# Patient Record
Sex: MALE | Race: WHITE | NOT HISPANIC OR LATINO
[De-identification: names, ages, dates, MRNs, and addresses within clinical notes are randomized per-mention and may not be internally consistent; named-entity substitution may affect disease eponyms.]

---

## 2017-11-28 ENCOUNTER — APPOINTMENT (OUTPATIENT)
Dept: UROLOGY | Facility: CLINIC | Age: 57
End: 2017-11-28

## 2018-08-14 ENCOUNTER — APPOINTMENT (OUTPATIENT)
Dept: UROLOGY | Facility: CLINIC | Age: 58
End: 2018-08-14
Payer: COMMERCIAL

## 2018-08-14 VITALS
HEART RATE: 71 BPM | BODY MASS INDEX: 28 KG/M2 | RESPIRATION RATE: 17 BRPM | WEIGHT: 200 LBS | DIASTOLIC BLOOD PRESSURE: 81 MMHG | HEIGHT: 71 IN | SYSTOLIC BLOOD PRESSURE: 123 MMHG | TEMPERATURE: 98.2 F

## 2018-08-14 DIAGNOSIS — C61 MALIGNANT NEOPLASM OF PROSTATE: ICD-10-CM

## 2018-08-14 PROCEDURE — 99213 OFFICE O/P EST LOW 20 MIN: CPT

## 2020-03-10 ENCOUNTER — APPOINTMENT (OUTPATIENT)
Dept: INTERNAL MEDICINE | Facility: CLINIC | Age: 60
End: 2020-03-10

## 2020-03-19 ENCOUNTER — APPOINTMENT (OUTPATIENT)
Dept: FAMILY MEDICINE | Facility: CLINIC | Age: 60
End: 2020-03-19

## 2022-11-14 ENCOUNTER — LABORATORY RESULT (OUTPATIENT)
Age: 62
End: 2022-11-14

## 2022-11-14 ENCOUNTER — APPOINTMENT (OUTPATIENT)
Dept: UROLOGY | Facility: CLINIC | Age: 62
End: 2022-11-14

## 2022-11-14 VITALS
SYSTOLIC BLOOD PRESSURE: 118 MMHG | BODY MASS INDEX: 28.7 KG/M2 | TEMPERATURE: 97.4 F | HEIGHT: 71 IN | DIASTOLIC BLOOD PRESSURE: 80 MMHG | WEIGHT: 205 LBS

## 2022-11-14 DIAGNOSIS — R35.0 FREQUENCY OF MICTURITION: ICD-10-CM

## 2022-11-14 DIAGNOSIS — N30.91 CYSTITIS, UNSPECIFIED WITH HEMATURIA: ICD-10-CM

## 2022-11-14 DIAGNOSIS — N39.0 URINARY TRACT INFECTION, SITE NOT SPECIFIED: ICD-10-CM

## 2022-11-14 DIAGNOSIS — Z85.46 PERSONAL HISTORY OF MALIGNANT NEOPLASM OF PROSTATE: ICD-10-CM

## 2022-11-14 DIAGNOSIS — N30.40 IRRADIATION CYSTITIS W/OUT HEMATURIA: ICD-10-CM

## 2022-11-14 DIAGNOSIS — E78.00 PURE HYPERCHOLESTEROLEMIA, UNSPECIFIED: ICD-10-CM

## 2022-11-14 DIAGNOSIS — N39.3 STRESS INCONTINENCE (FEMALE) (MALE): ICD-10-CM

## 2022-11-14 DIAGNOSIS — T66.XXXA RADIATION SICKNESS, UNSPECIFIED, INITIAL ENCOUNTER: ICD-10-CM

## 2022-11-14 DIAGNOSIS — C61 MALIGNANT NEOPLASM OF PROSTATE: ICD-10-CM

## 2022-11-14 DIAGNOSIS — Z96.0 PRESENCE OF UROGENITAL IMPLANTS: ICD-10-CM

## 2022-11-14 DIAGNOSIS — R33.9 RETENTION OF URINE, UNSPECIFIED: ICD-10-CM

## 2022-11-14 DIAGNOSIS — Z48.816 ENCOUNTER FOR SURGICAL AFTERCARE FOLLOWING SURGERY ON THE GENITOURINARY SYSTEM: ICD-10-CM

## 2022-11-14 PROCEDURE — 51741 ELECTRO-UROFLOWMETRY FIRST: CPT

## 2022-11-14 PROCEDURE — 99205 OFFICE O/P NEW HI 60 MIN: CPT | Mod: 25

## 2022-11-14 PROCEDURE — 52000 CYSTOURETHROSCOPY: CPT

## 2022-11-14 PROCEDURE — 51798 US URINE CAPACITY MEASURE: CPT | Mod: 59

## 2022-11-14 RX ORDER — EVOLOCUMAB 140 MG/ML
140 INJECTION, SOLUTION SUBCUTANEOUS
Refills: 0 | Status: ACTIVE | COMMUNITY

## 2022-11-14 RX ORDER — FINASTERIDE 1 MG/1
TABLET ORAL
Refills: 0 | Status: ACTIVE | COMMUNITY

## 2022-11-14 NOTE — HISTORY OF PRESENT ILLNESS
[FreeTextEntry1] : 62M with hx of prostate CA 2008 with adjuvant radiation 2009, ED s/p IPP, presenting for urinary incontinence.\par \par The incontinence is associated with strenuous activity/exercise. It is less severe when he is not active.\par However it is becoming more bothersome to patient. Uses ~2 ppd.\par Also notes he has been getting more UTIs. About 4 per year. The UTIs are associated with gross hematuria.\par He has not had a cystoscopy or renal imaging.\par \par PSAs have been undetectable per patient.\par \par IPP still works well. No issue with it.

## 2022-11-14 NOTE — ASSESSMENT
[FreeTextEntry1] : BLADDER SCAN:\par Indication: Increased frequency of urination. \par Initial Volume: 260   cc\par PVR: 13  cc\par Results: Stress urinary incontinence. \par Recommendations: No Therapy Needed.\par \par URO FLOWMETRY:\par Indication: Increased frequency of urination. \par Urinary Flow Rate:  11.2  m/s\par Results: Stress urinary incontinence    \par Recommendations: No therapy needed.\par \par BARBARA COSTA \par Jul 29 1960 \par 11/14/2022 \par \par Procedure: Cystoscopy \par \par Location: [facility] at [address] [place] [address] \par Surgeon: SHARMIN Simon M.D.\par Preop Diagnosis: Prostate cancer, hematuria Rule out tumor Postop Diagnosis\par Postop Diagnosis: Same, No stricture noted, no bladder tumor\par  Anesthesia: 2% Intraurethral Lidocaine Jelly \par Medication: Cipro \par Complications: None \par \par Operative Note: \par \par Discussed with the patient, risks and benefits of cystourethroscopy which includes hematuria, dysuria, stricture formation. The patient agrees to proceed with the above procedure.\par  The patient was placed in the dorsal lithotomy position, prepped and draped in the usual standard sterile fashion with surgical Betadine soap and wash. 2% lidocaine jelly was inserted intraurethrally via the meatus. A clamp was applied to the penis and lidocaine jelly was allowed to remain in place for 5 minutes. The meatus was then intubated with a 16F flexible cystoscope. Visual cystourethroscopic examination was initiated under sterile water irrigation. \par The following findings were noted:\par \par The anterior urethra was normal. \par The Bulbar urethra was also normal. \par The membranous urethra was normal without any strictures. \par Ureteral orifices were identified and clear efflux of urine was observed bilaterally. \par The bladder was examined in its entirety in a systematic fashion. \par Trabeculation observed: Mild\par Spots of erythema in bladder (radiation changes). Small area of cystitis on right lateral wall\par \par The patient tolerated the procedure well.\par He was discharged to home with PO antibiotics in stable condition. \par \par After cystoscopy:\par \par BLADDER SCAN:\par Indication: Increased frequency of urination. \par Initial Volume:  432  cc\par PVR:  25 cc\par Results: Stress urinary incontinence. \par Recommendations: No Therapy Needed.\par \par URO FLOWMETRY:\par Indication: Increased frequency of urination. \par Urinary Flow Rate:  13.7  m/s\par Results: Stress urinary incontinence    \par Recommendations: No therapy needed.\par

## 2022-12-22 LAB — URINE CYTOLOGY: NORMAL

## 2024-08-22 ENCOUNTER — NON-APPOINTMENT (OUTPATIENT)
Age: 64
End: 2024-08-22

## 2024-08-23 ENCOUNTER — TRANSCRIPTION ENCOUNTER (OUTPATIENT)
Age: 64
End: 2024-08-23

## 2024-08-24 ENCOUNTER — INPATIENT (INPATIENT)
Facility: HOSPITAL | Age: 64
LOS: 2 days | Discharge: HOME | DRG: 690 | End: 2024-08-27
Attending: GENERAL PRACTICE | Admitting: GENERAL PRACTICE
Payer: COMMERCIAL

## 2024-08-24 VITALS
DIASTOLIC BLOOD PRESSURE: 83 MMHG | OXYGEN SATURATION: 98 % | WEIGHT: 199.96 LBS | HEIGHT: 71 IN | TEMPERATURE: 100 F | SYSTOLIC BLOOD PRESSURE: 132 MMHG | HEART RATE: 71 BPM | RESPIRATION RATE: 18 BRPM

## 2024-08-24 DIAGNOSIS — Z90.79 ACQUIRED ABSENCE OF OTHER GENITAL ORGAN(S): Chronic | ICD-10-CM

## 2024-08-24 LAB
ALBUMIN SERPL ELPH-MCNC: 3.7 G/DL — SIGNIFICANT CHANGE UP (ref 3.3–5)
ALP SERPL-CCNC: 63 U/L — SIGNIFICANT CHANGE UP (ref 40–120)
ALT FLD-CCNC: 40 U/L — SIGNIFICANT CHANGE UP (ref 10–45)
ANION GAP SERPL CALC-SCNC: 12 MMOL/L — SIGNIFICANT CHANGE UP (ref 5–17)
APPEARANCE UR: CLEAR — SIGNIFICANT CHANGE UP
APTT BLD: 31.4 SEC — SIGNIFICANT CHANGE UP (ref 24.5–35.6)
AST SERPL-CCNC: 34 U/L — SIGNIFICANT CHANGE UP (ref 10–40)
BACTERIA # UR AUTO: NEGATIVE /HPF — SIGNIFICANT CHANGE UP
BASOPHILS # BLD AUTO: 0.02 K/UL — SIGNIFICANT CHANGE UP (ref 0–0.2)
BASOPHILS NFR BLD AUTO: 0.2 % — SIGNIFICANT CHANGE UP (ref 0–2)
BILIRUB SERPL-MCNC: 0.4 MG/DL — SIGNIFICANT CHANGE UP (ref 0.2–1.2)
BILIRUB UR-MCNC: NEGATIVE — SIGNIFICANT CHANGE UP
BUN SERPL-MCNC: 13 MG/DL — SIGNIFICANT CHANGE UP (ref 7–23)
CALCIUM SERPL-MCNC: 9.2 MG/DL — SIGNIFICANT CHANGE UP (ref 8.4–10.5)
CAST: 1 /LPF — SIGNIFICANT CHANGE UP (ref 0–4)
CHLORIDE SERPL-SCNC: 103 MMOL/L — SIGNIFICANT CHANGE UP (ref 96–108)
CO2 SERPL-SCNC: 23 MMOL/L — SIGNIFICANT CHANGE UP (ref 22–31)
COLOR SPEC: YELLOW — SIGNIFICANT CHANGE UP
CREAT SERPL-MCNC: 1.2 MG/DL — SIGNIFICANT CHANGE UP (ref 0.5–1.3)
DIFF PNL FLD: ABNORMAL
EGFR: 68 ML/MIN/1.73M2 — SIGNIFICANT CHANGE UP
EOSINOPHIL # BLD AUTO: 0.01 K/UL — SIGNIFICANT CHANGE UP (ref 0–0.5)
EOSINOPHIL NFR BLD AUTO: 0.1 % — SIGNIFICANT CHANGE UP (ref 0–6)
GAS PNL BLDV: SIGNIFICANT CHANGE UP
GLUCOSE SERPL-MCNC: 111 MG/DL — HIGH (ref 70–99)
GLUCOSE UR QL: NEGATIVE MG/DL — SIGNIFICANT CHANGE UP
HCT VFR BLD CALC: 43.8 % — SIGNIFICANT CHANGE UP (ref 39–50)
HGB BLD-MCNC: 15 G/DL — SIGNIFICANT CHANGE UP (ref 13–17)
IMM GRANULOCYTES NFR BLD AUTO: 0.6 % — SIGNIFICANT CHANGE UP (ref 0–0.9)
INR BLD: 1.1 RATIO — SIGNIFICANT CHANGE UP (ref 0.85–1.18)
KETONES UR-MCNC: ABNORMAL MG/DL
LEUKOCYTE ESTERASE UR-ACNC: ABNORMAL
LYMPHOCYTES # BLD AUTO: 0.49 K/UL — LOW (ref 1–3.3)
LYMPHOCYTES # BLD AUTO: 4.9 % — LOW (ref 13–44)
MCHC RBC-ENTMCNC: 32.1 PG — SIGNIFICANT CHANGE UP (ref 27–34)
MCHC RBC-ENTMCNC: 34.2 GM/DL — SIGNIFICANT CHANGE UP (ref 32–36)
MCV RBC AUTO: 93.6 FL — SIGNIFICANT CHANGE UP (ref 80–100)
MONOCYTES # BLD AUTO: 0.96 K/UL — HIGH (ref 0–0.9)
MONOCYTES NFR BLD AUTO: 9.5 % — SIGNIFICANT CHANGE UP (ref 2–14)
NEUTROPHILS # BLD AUTO: 8.54 K/UL — HIGH (ref 1.8–7.4)
NEUTROPHILS NFR BLD AUTO: 84.7 % — HIGH (ref 43–77)
NITRITE UR-MCNC: NEGATIVE — SIGNIFICANT CHANGE UP
NRBC # BLD: 0 /100 WBCS — SIGNIFICANT CHANGE UP (ref 0–0)
PH UR: 8 — SIGNIFICANT CHANGE UP (ref 5–8)
PLATELET # BLD AUTO: 159 K/UL — SIGNIFICANT CHANGE UP (ref 150–400)
POTASSIUM SERPL-MCNC: 4 MMOL/L — SIGNIFICANT CHANGE UP (ref 3.5–5.3)
POTASSIUM SERPL-SCNC: 4 MMOL/L — SIGNIFICANT CHANGE UP (ref 3.5–5.3)
PROT SERPL-MCNC: 6.8 G/DL — SIGNIFICANT CHANGE UP (ref 6–8.3)
PROT UR-MCNC: 100 MG/DL
PROTHROM AB SERPL-ACNC: 11.5 SEC — SIGNIFICANT CHANGE UP (ref 9.5–13)
RBC # BLD: 4.68 M/UL — SIGNIFICANT CHANGE UP (ref 4.2–5.8)
RBC # FLD: 13.2 % — SIGNIFICANT CHANGE UP (ref 10.3–14.5)
RBC CASTS # UR COMP ASSIST: 6 /HPF — HIGH (ref 0–4)
SODIUM SERPL-SCNC: 138 MMOL/L — SIGNIFICANT CHANGE UP (ref 135–145)
SP GR SPEC: 1.02 — SIGNIFICANT CHANGE UP (ref 1–1.03)
SQUAMOUS # UR AUTO: 1 /HPF — SIGNIFICANT CHANGE UP (ref 0–5)
UROBILINOGEN FLD QL: 2 MG/DL (ref 0.2–1)
WBC # BLD: 10.08 K/UL — SIGNIFICANT CHANGE UP (ref 3.8–10.5)
WBC # FLD AUTO: 10.08 K/UL — SIGNIFICANT CHANGE UP (ref 3.8–10.5)
WBC UR QL: 17 /HPF — HIGH (ref 0–5)

## 2024-08-24 PROCEDURE — 99223 1ST HOSP IP/OBS HIGH 75: CPT

## 2024-08-24 RX ORDER — KETOROLAC TROMETHAMINE 30 MG/ML
15 INJECTION, SOLUTION INTRAMUSCULAR ONCE
Refills: 0 | Status: DISCONTINUED | OUTPATIENT
Start: 2024-08-24 | End: 2024-08-24

## 2024-08-24 RX ORDER — ACETAMINOPHEN 325 MG/1
1000 TABLET ORAL ONCE
Refills: 0 | Status: COMPLETED | OUTPATIENT
Start: 2024-08-24 | End: 2024-08-24

## 2024-08-24 RX ADMIN — KETOROLAC TROMETHAMINE 15 MILLIGRAM(S): 30 INJECTION, SOLUTION INTRAMUSCULAR at 23:35

## 2024-08-24 RX ADMIN — ACETAMINOPHEN 1000 MILLIGRAM(S): 325 TABLET ORAL at 22:15

## 2024-08-24 RX ADMIN — Medication 125 MILLILITER(S): at 22:51

## 2024-08-24 RX ADMIN — Medication 200 MILLIGRAM(S): at 18:14

## 2024-08-24 RX ADMIN — Medication 1000 MILLILITER(S): at 18:14

## 2024-08-24 RX ADMIN — ACETAMINOPHEN 400 MILLIGRAM(S): 325 TABLET ORAL at 21:45

## 2024-08-24 NOTE — ED PROVIDER NOTE - OBJECTIVE STATEMENT
62M with hx of prostate CA 2008 with adjuvant radiation 2009, ED s/p inflatable penile prosthesis, presents for worsening back pain. Per pt, he has multiple UTI, however, no culture in our system. Seen in this ED yesterday for dysuria. Dx with UTI. Given CTX at ED, d/c home with cefpodoxime. Reports episode of rigors and worsening back pain overnight. Return to ED for further work up. +fever, chills, back pain. nausea. No vomiting. no dysuria.

## 2024-08-24 NOTE — ED PROVIDER NOTE - CLINICAL SUMMARY MEDICAL DECISION MAKING FREE TEXT BOX
62M with hx of prostate CA 2008 with adjuvant radiation 2009, ED s/p inflatable penile prosthesis, presents for UTI, questionable failed outpatient therapy. Will repeat work up. Because pt reports worsening sx on Cefpodoxime, will put on cipro. Likely admit to CDU pending culture.

## 2024-08-24 NOTE — ED ADULT NURSE NOTE - OBJECTIVE STATEMENT
64  year old male was seen last night in the ed ans d/c with UTI.  [place] better and sent home with oral antibiotics and still has pain and fever Last took Tylenol at 3 pm IV placed and bloods and urine sent as ordered MpRN

## 2024-08-24 NOTE — ED ADULT NURSE NOTE - HIV OFFER
How Severe Is Your Skin Lesion?: mild
Is This A New Presentation, Or A Follow-Up?: Growths
Additional History: Pt notes “oily deposits” to his forehead.
Previously Declined (within the last year)

## 2024-08-24 NOTE — ED PROVIDER NOTE - PHYSICAL EXAMINATION
GENERAL: Alert. No acute distress.   EYES: EOMI grossly normal. Anicteric.   HENT: Moist mucous membranes.   RESP: No conversation dyspnea, no resp distress, CTAB   CARDIOVASCULAR: RRR, no m/g/r  ABDOMEN: soft, non distended, nttp  MSK: ROM grossly normal in all 4 extremities. No deformities  SKIN: warm and dry  NEUROLOGIC: Alert and oriented x3  PSYCHIATRIC: Cooperative. Appropriate mood and affect

## 2024-08-24 NOTE — ED PROVIDER NOTE - ATTENDING CONTRIBUTION TO CARE
62M with hx of prostate CA 2008 with adjuvant radiation 2009, ED s/p inflatable penile prosthesis, presents 62M with hx of prostate CA 2008 with adjuvant radiation 2009, ED s/p inflatable penile prosthesis, presents With fever and chills.  Patient reports he was seen and evaluated in the emergency department yesterday, diagnosed with a urinary tract infection and sent home with cefpodoxime.  Patient states his dysuria has improved but continues to have fever and chills. Patient follows with Dr. Simon for urology, was prompted to go to the emergency department. Denies chest pain, shortness of breath, Denies pain, nausea, vomiting, diarrhea, dysuria, hematuria, Testicular swelling, testicular pain, penile discharge.    Physical Exam:  Gen: NAD, awake and alert, non-toxic appearing  HEENT: Atraumatic, oropharynx clear, moist mucous membranes, normal conjunctiva  Cardio: RRR, no murmurs, rubs or gallops  Lung: CTAB, no respiratory distress, no wheezes/rhonchi/rales B/L  Abd: soft, NT, ND, no guarding, no rigidity, no rebound tenderness, no CVA tenderness     Patient presents with fever and chills in setting of presumably failed outpatient UTI treatment. Vitals within normal limits, hemodynamically stable, exam as above.  Low suspicion for pyelonephritis, nephrolithiasis (no colicky pain, no CVAT) Will obtain CBC, CMP, UA, urine culture, and switch antibiotics to ciprofloxacin. Patient will require observation to follow-up speciation of urine culture. Patient and family at bedside agree with plan, all questions answered.

## 2024-08-24 NOTE — ED PROVIDER NOTE - PROGRESS NOTE DETAILS
Aleena Jin) [facility] PGY-3: white count improved. UA improved. Discuss this with pt and family. While pt continued to have fever, on lab test, UTI appears improved. Will discuss with CDU PA to keep for culture result. If culture is atypical, consider urology consult and CT imaging for penile prosthesis in the AM.

## 2024-08-25 DIAGNOSIS — N12 TUBULO-INTERSTITIAL NEPHRITIS, NOT SPECIFIED AS ACUTE OR CHRONIC: ICD-10-CM

## 2024-08-25 DIAGNOSIS — C61 MALIGNANT NEOPLASM OF PROSTATE: ICD-10-CM

## 2024-08-25 DIAGNOSIS — A41.9 SEPSIS, UNSPECIFIED ORGANISM: ICD-10-CM

## 2024-08-25 DIAGNOSIS — E78.5 HYPERLIPIDEMIA, UNSPECIFIED: ICD-10-CM

## 2024-08-25 LAB
ALBUMIN SERPL ELPH-MCNC: 3.1 G/DL — LOW (ref 3.3–5)
ALP SERPL-CCNC: 56 U/L — SIGNIFICANT CHANGE UP (ref 40–120)
ALT FLD-CCNC: 32 U/L — SIGNIFICANT CHANGE UP (ref 10–45)
ANION GAP SERPL CALC-SCNC: 11 MMOL/L — SIGNIFICANT CHANGE UP (ref 5–17)
AST SERPL-CCNC: 24 U/L — SIGNIFICANT CHANGE UP (ref 10–40)
BASOPHILS # BLD AUTO: 0.02 K/UL — SIGNIFICANT CHANGE UP (ref 0–0.2)
BASOPHILS NFR BLD AUTO: 0.2 % — SIGNIFICANT CHANGE UP (ref 0–2)
BILIRUB SERPL-MCNC: 0.3 MG/DL — SIGNIFICANT CHANGE UP (ref 0.2–1.2)
BUN SERPL-MCNC: 13 MG/DL — SIGNIFICANT CHANGE UP (ref 7–23)
CALCIUM SERPL-MCNC: 9 MG/DL — SIGNIFICANT CHANGE UP (ref 8.4–10.5)
CHLORIDE SERPL-SCNC: 106 MMOL/L — SIGNIFICANT CHANGE UP (ref 96–108)
CO2 SERPL-SCNC: 21 MMOL/L — LOW (ref 22–31)
CREAT SERPL-MCNC: 1.08 MG/DL — SIGNIFICANT CHANGE UP (ref 0.5–1.3)
CULTURE RESULTS: NO GROWTH — SIGNIFICANT CHANGE UP
EGFR: 78 ML/MIN/1.73M2 — SIGNIFICANT CHANGE UP
EOSINOPHIL # BLD AUTO: 0.03 K/UL — SIGNIFICANT CHANGE UP (ref 0–0.5)
EOSINOPHIL NFR BLD AUTO: 0.3 % — SIGNIFICANT CHANGE UP (ref 0–6)
GLUCOSE SERPL-MCNC: 112 MG/DL — HIGH (ref 70–99)
HCT VFR BLD CALC: 42.8 % — SIGNIFICANT CHANGE UP (ref 39–50)
HGB BLD-MCNC: 14.2 G/DL — SIGNIFICANT CHANGE UP (ref 13–17)
IMM GRANULOCYTES NFR BLD AUTO: 0.6 % — SIGNIFICANT CHANGE UP (ref 0–0.9)
LYMPHOCYTES # BLD AUTO: 1.18 K/UL — SIGNIFICANT CHANGE UP (ref 1–3.3)
LYMPHOCYTES # BLD AUTO: 12.1 % — LOW (ref 13–44)
MCHC RBC-ENTMCNC: 31.9 PG — SIGNIFICANT CHANGE UP (ref 27–34)
MCHC RBC-ENTMCNC: 33.2 GM/DL — SIGNIFICANT CHANGE UP (ref 32–36)
MCV RBC AUTO: 96.2 FL — SIGNIFICANT CHANGE UP (ref 80–100)
MONOCYTES # BLD AUTO: 0.95 K/UL — HIGH (ref 0–0.9)
MONOCYTES NFR BLD AUTO: 9.8 % — SIGNIFICANT CHANGE UP (ref 2–14)
NEUTROPHILS # BLD AUTO: 7.49 K/UL — HIGH (ref 1.8–7.4)
NEUTROPHILS NFR BLD AUTO: 77 % — SIGNIFICANT CHANGE UP (ref 43–77)
NRBC # BLD: 0 /100 WBCS — SIGNIFICANT CHANGE UP (ref 0–0)
PLATELET # BLD AUTO: 143 K/UL — LOW (ref 150–400)
POTASSIUM SERPL-MCNC: 4.3 MMOL/L — SIGNIFICANT CHANGE UP (ref 3.5–5.3)
POTASSIUM SERPL-SCNC: 4.3 MMOL/L — SIGNIFICANT CHANGE UP (ref 3.5–5.3)
PROT SERPL-MCNC: 6.1 G/DL — SIGNIFICANT CHANGE UP (ref 6–8.3)
RBC # BLD: 4.45 M/UL — SIGNIFICANT CHANGE UP (ref 4.2–5.8)
RBC # FLD: 13.2 % — SIGNIFICANT CHANGE UP (ref 10.3–14.5)
SODIUM SERPL-SCNC: 138 MMOL/L — SIGNIFICANT CHANGE UP (ref 135–145)
SPECIMEN SOURCE: SIGNIFICANT CHANGE UP
WBC # BLD: 9.73 K/UL — SIGNIFICANT CHANGE UP (ref 3.8–10.5)
WBC # FLD AUTO: 9.73 K/UL — SIGNIFICANT CHANGE UP (ref 3.8–10.5)

## 2024-08-25 PROCEDURE — 99223 1ST HOSP IP/OBS HIGH 75: CPT

## 2024-08-25 PROCEDURE — 99221 1ST HOSP IP/OBS SF/LOW 40: CPT

## 2024-08-25 PROCEDURE — 99233 SBSQ HOSP IP/OBS HIGH 50: CPT

## 2024-08-25 PROCEDURE — 74177 CT ABD & PELVIS W/CONTRAST: CPT | Mod: 26,MC

## 2024-08-25 RX ORDER — NITROFURANTOIN MONOHYD/M-CRYST 100 MG
100 CAPSULE ORAL
Refills: 0 | Status: DISCONTINUED | OUTPATIENT
Start: 2024-08-25 | End: 2024-08-25

## 2024-08-25 RX ORDER — ACETAMINOPHEN 325 MG/1
1000 TABLET ORAL ONCE
Refills: 0 | Status: COMPLETED | OUTPATIENT
Start: 2024-08-25 | End: 2024-08-25

## 2024-08-25 RX ORDER — FINASTERIDE 1 MG/1
1 TABLET, FILM COATED ORAL DAILY
Refills: 0 | Status: DISCONTINUED | OUTPATIENT
Start: 2024-08-25 | End: 2024-08-26

## 2024-08-25 RX ORDER — FINASTERIDE 1 MG/1
1 TABLET, FILM COATED ORAL
Refills: 0 | DISCHARGE

## 2024-08-25 RX ORDER — KETOROLAC TROMETHAMINE 30 MG/ML
15 INJECTION, SOLUTION INTRAMUSCULAR ONCE
Refills: 0 | Status: DISCONTINUED | OUTPATIENT
Start: 2024-08-25 | End: 2024-08-25

## 2024-08-25 RX ORDER — MAGNESIUM, ALUMINUM HYDROXIDE 200-225/5
30 SUSPENSION, ORAL (FINAL DOSE FORM) ORAL EVERY 4 HOURS
Refills: 0 | Status: DISCONTINUED | OUTPATIENT
Start: 2024-08-25 | End: 2024-08-27

## 2024-08-25 RX ORDER — TAMSULOSIN HYDROCHLORIDE 0.4 MG/1
0.4 CAPSULE ORAL AT BEDTIME
Refills: 0 | Status: DISCONTINUED | OUTPATIENT
Start: 2024-08-25 | End: 2024-08-27

## 2024-08-25 RX ORDER — ONDANSETRON 2 MG/ML
4 INJECTION, SOLUTION INTRAMUSCULAR; INTRAVENOUS EVERY 8 HOURS
Refills: 0 | Status: DISCONTINUED | OUTPATIENT
Start: 2024-08-25 | End: 2024-08-27

## 2024-08-25 RX ORDER — ACETAMINOPHEN 325 MG/1
650 TABLET ORAL EVERY 6 HOURS
Refills: 0 | Status: DISCONTINUED | OUTPATIENT
Start: 2024-08-25 | End: 2024-08-27

## 2024-08-25 RX ORDER — EVOLOCUMAB 140 MG/ML
140 INJECTION, SOLUTION SUBCUTANEOUS
Refills: 0 | DISCHARGE

## 2024-08-25 RX ADMIN — Medication 100 MILLIGRAM(S): at 09:20

## 2024-08-25 RX ADMIN — ACETAMINOPHEN 650 MILLIGRAM(S): 325 TABLET ORAL at 20:08

## 2024-08-25 RX ADMIN — Medication 125 MILLILITER(S): at 19:39

## 2024-08-25 RX ADMIN — KETOROLAC TROMETHAMINE 15 MILLIGRAM(S): 30 INJECTION, SOLUTION INTRAMUSCULAR at 11:57

## 2024-08-25 RX ADMIN — TAMSULOSIN HYDROCHLORIDE 0.4 MILLIGRAM(S): 0.4 CAPSULE ORAL at 21:16

## 2024-08-25 RX ADMIN — ACETAMINOPHEN 400 MILLIGRAM(S): 325 TABLET ORAL at 11:57

## 2024-08-25 RX ADMIN — ACETAMINOPHEN 650 MILLIGRAM(S): 325 TABLET ORAL at 19:38

## 2024-08-25 RX ADMIN — Medication 200 MILLIGRAM(S): at 05:21

## 2024-08-25 RX ADMIN — Medication 125 MILLILITER(S): at 09:16

## 2024-08-25 RX ADMIN — KETOROLAC TROMETHAMINE 15 MILLIGRAM(S): 30 INJECTION, SOLUTION INTRAMUSCULAR at 00:05

## 2024-08-25 NOTE — H&P ADULT - NSHPLABSRESULTS_GEN_ALL_CORE
.  LABS:                         14.2   9.73  )-----------( 143      ( 25 Aug 2024 06:11 )             42.8     08-25    138  |  106  |  13  ----------------------------<  112<H>  4.3   |  21<L>  |  1.08    Ca    9.0      25 Aug 2024 06:11    TPro  6.1  /  Alb  3.1<L>  /  TBili  0.3  /  DBili  x   /  AST  24  /  ALT  32  /  AlkPhos  56  08-25    PT/INR - ( 24 Aug 2024 17:41 )   PT: 11.5 sec;   INR: 1.10 ratio         PTT - ( 24 Aug 2024 17:41 )  PTT:31.4 sec  Urinalysis Basic - ( 25 Aug 2024 06:11 )    Color: x / Appearance: x / SG: x / pH: x  Gluc: 112 mg/dL / Ketone: x  / Bili: x / Urobili: x   Blood: x / Protein: x / Nitrite: x   Leuk Esterase: x / RBC: x / WBC x   Sq Epi: x / Non Sq Epi: x / Bacteria: x            RADIOLOGY, EKG & ADDITIONAL TESTS: Reviewed.

## 2024-08-25 NOTE — H&P ADULT - NSHPSOCIALHISTORY_GEN_ALL_CORE
08-May-2018 02:05 occasional drinker, last drink last week, nonsmoker, lives at home with wife, functionally independent

## 2024-08-25 NOTE — ED CDU PROVIDER SUBSEQUENT DAY NOTE - PHYSICAL EXAMINATION
· CONSTITUTIONAL: Well appearing, awake, alert, oriented to person, place, time/situation and in no apparent distress.  · ENMT: Airway patent.  · EYES: Clear bilaterally  · CARDIAC: Normal rate, regular rhythm.  Heart sounds S1, S2.  · RESPIRATORY: CTAB  · GASTROINTESTINAL: Abdomen soft, non-tender, no CVAT  · MUSCULOSKELETAL: No lower extremity edema  · NEUROLOGICAL: Alert and oriented, clear speech, follows commands, moving all extremities  · SKIN: No visible rash.  · PSYCHIATRIC: normal mood and affect.

## 2024-08-25 NOTE — ED CDU PROVIDER SUBSEQUENT DAY NOTE - HISTORY
No interval changes since initial CDU provider note. Pt  without new complaint. NAD VSS. Plan to continue antibiotics in the setting of UTI. - MIMI Carney

## 2024-08-25 NOTE — H&P ADULT - PROBLEM SELECTOR PLAN 1
2/2 pyelonephritis, found to have mild R hydro as well  - prelim ucx growing ecoli,, continue ctx 1gm iv q24 hours  - f/u final cultures/sensititivies  - f/u PVR  - urology f/u as OP for recurrent UTI  - add flomax

## 2024-08-25 NOTE — CONSULT NOTE ADULT - SUBJECTIVE AND OBJECTIVE BOX
Patient is a 64y old  Male who presents with a chief complaint of     HPI:    "62M with hx of prostate CA 2008 with adjuvant radiation 2009, ED s/p inflatable penile prosthesis, presents for worsening back pain. Per pt, he has multiple UTI, however, no culture in our system. Seen in this ED yesterday for dysuria. Dx with UTI. Given CTX at ED, d/c home with cefpodoxime. Reports episode of rigors and worsening back pain overnight. Return to ED for further work up. +fever, chills, back pain. nausea. No vomiting. no dysuria."    ED course: T max 102.9, WBC 16-->9    -->17 WBC, Ucx E coli    prior hospital charts reviewed [  ]  primary team notes reviewed [ x ]  other consultant notes reviewed [  ]    PAST MEDICAL & SURGICAL HISTORY:  Prostate cancer      S/P prostatectomy          Allergies  penicillins (Other)    ANTIMICROBIALS (past 90 days)  MEDICATIONS  (STANDING):  cefTRIAXone   IVPB   100 mL/Hr IV Intermittent (08-25-24 @ 09:20)    ciprofloxacin   IVPB   200 mL/Hr IV Intermittent (08-24-24 @ 18:14)    ciprofloxacin   IVPB   200 mL/Hr IV Intermittent (08-25-24 @ 05:21)        cefTRIAXone   IVPB 1000 every 12 hours  nitrofurantoin monohydrate/macrocrystals (MACROBID) 100 two times a day    MEDICATIONS  (STANDING):    SOCIAL HISTORY:       FAMILY HISTORY:  No pertinent family history in first degree relatives      REVIEW OF SYSTEMS  [  ] ROS unobtainable because:    [  ] All other systems negative except as noted below:	    Constitutional:  [ ] fever [ ] chills  [ ] weight loss  [ ] weakness  Skin:  [ ] rash [ ] phlebitis	  Eyes: [ ] icterus [ ] pain  [ ] discharge	  ENMT: [ ] sore throat  [ ] thrush [ ] ulcers [ ] exudates  Respiratory: [ ] dyspnea [ ] hemoptysis [ ] cough [ ] sputum	  Cardiovascular:  [ ] chest pain [ ] palpitations [ ] edema	  Gastrointestinal:  [ ] nausea [ ] vomiting [ ] diarrhea [ ] constipation [ ] pain	  Genitourinary:  [ ] dysuria [ ] frequency [ ] hematuria [ ] discharge [ ] flank pain  [ ] incontinence  Musculoskeletal:  [ ] myalgias [ ] arthralgias [ ] arthritis  [ ] back pain  Neurological:  [ ] headache [ ] seizures  [ ] confusion/altered mental status  Psychiatric:  [ ] anxiety [ ] depression	  Hematology/Lymphatics:  [ ] lymphadenopathy  Endocrine:  [ ] adrenal [ ] thyroid  Allergic/Immunologic:	 [ ] transplant [ ] seasonal    Vital Signs Last 24 Hrs  T(F): 98.4 (08-25-24 @ 08:00), Max: 102.9 (08-24-24 @ 21:22)  Vital Signs Last 24 Hrs  HR: 80 (08-25-24 @ 08:00) (67 - 98)  BP: 143/86 (08-25-24 @ 08:00) (109/71 - 143/86)  RR: 18 (08-25-24 @ 08:00)  SpO2: 96% (08-25-24 @ 08:00) (96% - 98%)  Wt(kg): --    PHYSICAL EXAM:                              14.2   9.73  )-----------( 143      ( 25 Aug 2024 06:11 )             42.8   08-25    138  |  106  |  13  ----------------------------<  112<H>  4.3   |  21<L>  |  1.08    Ca    9.0      25 Aug 2024 06:11    TPro  6.1  /  Alb  3.1<L>  /  TBili  0.3  /  DBili  x   /  AST  24  /  ALT  32  /  AlkPhos  56  08-25    Urinalysis Basic - ( 25 Aug 2024 06:11 )    Color: x / Appearance: x / SG: x / pH: x  Gluc: 112 mg/dL / Ketone: x  / Bili: x / Urobili: x   Blood: x / Protein: x / Nitrite: x   Leuk Esterase: x / RBC: x / WBC x   Sq Epi: x / Non Sq Epi: x / Bacteria: x    MICROBIOLOGY:  Culture - Urine (collected 23 Aug 2024 19:20)  Source: Clean Catch Clean Catch (Midstream)  Preliminary Report (25 Aug 2024 06:25):    >100,000 CFU/ml Escherichia coli                  RADIOLOGY:  imaging below personally reviewed and agree with findings     Patient is a 64y old  Male who presents with a chief complaint of     HPI:    "62M with hx of prostate CA 2008 with adjuvant radiation 2009, ED s/p inflatable penile prosthesis, presents for worsening back pain. Per pt, he has multiple UTI, however, no culture in our system. Seen in this ED yesterday for dysuria. Dx with UTI. Given CTX at ED, d/c home with cefpodoxime. Reports episode of rigors and worsening back pain overnight. Return to ED for further work up. +fever, chills, back pain. nausea. No vomiting. no dysuria."    ED course: T max 102.9, WBC 16-->9    -->17 WBC, Ucx E coli    prior hospital charts reviewed [  ]  primary team notes reviewed [ x ]  other consultant notes reviewed [  ]    PAST MEDICAL & SURGICAL HISTORY:  Prostate cancer      S/P prostatectomy          Allergies  penicillins (Other)    ANTIMICROBIALS (past 90 days)  MEDICATIONS  (STANDING):  cefTRIAXone   IVPB   100 mL/Hr IV Intermittent (08-25-24 @ 09:20)    ciprofloxacin   IVPB   200 mL/Hr IV Intermittent (08-24-24 @ 18:14)    ciprofloxacin   IVPB   200 mL/Hr IV Intermittent (08-25-24 @ 05:21)        cefTRIAXone   IVPB 1000 every 12 hours  nitrofurantoin monohydrate/macrocrystals (MACROBID) 100 two times a day    MEDICATIONS  (STANDING):    SOCIAL HISTORY:       FAMILY HISTORY:  No pertinent family history in first degree relatives      REVIEW OF SYSTEMS  [  ] ROS unobtainable because:    [x  ] All other systems negative except as noted below:	    Constitutional:  x[x ] fever [x ] chills  [ ] weight loss  [ ] weakness  Skin:  [ ] rash [ ] phlebitis	  Eyes: [ ] icterus [ ] pain  [ ] discharge	  ENMT: [ ] sore throat  [ ] thrush [ ] ulcers [ ] exudates  Respiratory: [ ] dyspnea [ ] hemoptysis [ ] cough [ ] sputum	  Cardiovascular:  [ ] chest pain [ ] palpitations [ ] edema	  Gastrointestinal:  [ ] nausea [ ] vomiting [ ] diarrhea [ ] constipation [ ] pain	  Genitourinary:  [ ] dysuria [ ] frequency [ ] hematuria [ ] discharge [x ] flank pain  [ ] incontinence  Musculoskeletal:  [ ] myalgias [ ] arthralgias [ ] arthritis  [ ] back pain  Neurological:  [ ] headache [ ] seizures  [ ] confusion/altered mental status  Psychiatric:  [ ] anxiety [ ] depression	  Hematology/Lymphatics:  [ ] lymphadenopathy  Endocrine:  [ ] adrenal [ ] thyroid  Allergic/Immunologic:	 [ ] transplant [ ] seasonal    Vital Signs Last 24 Hrs  T(F): 98.4 (08-25-24 @ 08:00), Max: 102.9 (08-24-24 @ 21:22)  Vital Signs Last 24 Hrs  HR: 80 (08-25-24 @ 08:00) (67 - 98)  BP: 143/86 (08-25-24 @ 08:00) (109/71 - 143/86)  RR: 18 (08-25-24 @ 08:00)  SpO2: 96% (08-25-24 @ 08:00) (96% - 98%)  Wt(kg): --    PHYSICAL EXAM:    General: Patient in NAD  HEENT: NCAT, EOMI, PERRL, no oral lesions  CV: S1+S2, no m/r/g appreciated   Lungs: No respiratory distress, CTAB  Abd: Soft, nontender, no guarding, no rebound tenderness, + bowel sounds   : suprapubic tenderness +, rt flank tenderness+  Neuro: Alert and oriented to time, place and person. Moves all extremities against gravity.  Ext: No cyanosis, no edema  Skin: No rash, no phlebitis                              14.2   9.73  )-----------( 143      ( 25 Aug 2024 06:11 )             42.8   08-25    138  |  106  |  13  ----------------------------<  112<H>  4.3   |  21<L>  |  1.08    Ca    9.0      25 Aug 2024 06:11    TPro  6.1  /  Alb  3.1<L>  /  TBili  0.3  /  DBili  x   /  AST  24  /  ALT  32  /  AlkPhos  56  08-25    Urinalysis Basic - ( 25 Aug 2024 06:11 )    Color: x / Appearance: x / SG: x / pH: x  Gluc: 112 mg/dL / Ketone: x  / Bili: x / Urobili: x   Blood: x / Protein: x / Nitrite: x   Leuk Esterase: x / RBC: x / WBC x   Sq Epi: x / Non Sq Epi: x / Bacteria: x    MICROBIOLOGY:  Culture - Urine (collected 23 Aug 2024 19:20)  Source: Clean Catch Clean Catch (Midstream)  Preliminary Report (25 Aug 2024 06:25):    >100,000 CFU/ml Escherichia coli                  RADIOLOGY:  imaging below personally reviewed and agree with findings

## 2024-08-25 NOTE — CONSULT NOTE ADULT - SUBJECTIVE AND OBJECTIVE BOX
HPI: 62M with hx of prostate CA s/p prostatectomy with Dr. Sewell and RT, ED s/p inflatable penile prosthesis with Dr. Simon presents for worsening R flank pain associated with dysuria, increased frequency and fevers at home to 101 for the past few days.  Pt reports history of recurrent UTIs, about 3 per year, usually managed with oral abx as outpatient. Pt was in ED yesterday with dysuria and sent home with cefpodoxime, but reports worsening flank pain and rigors prompting him to return to ED. Pt denies nausea/vomiting, pain over penile prosthesis, testicular pain, abd pain or other acute complaints at this time. Denies any history of ureteral reimplant. Primary urologist Dr. Simon.    PAST MEDICAL & SURGICAL HISTORY:  Prostate cancer  S/P prostatectomy    MEDICATIONS  (STANDING):  lactated ringers. 1000 milliLiter(s) (125 mL/Hr) IV Continuous <Continuous>    FAMILY HISTORY:  No pertinent family history in first degree relatives      Allergies  penicillins (Other)    SOCIAL HISTORY:    REVIEW OF SYSTEMS: Otherwise negative as stated in HPI    Physical Exam  Vital signs  T(C): 38.4 (08-25-24 @ 11:46), Max: 39.5 (08-25-24 @ 11:44)  HR: 90 (08-25-24 @ 11:44)  BP: 129/80 (08-25-24 @ 11:44)  SpO2: 95% (08-25-24 @ 11:44)    Output    Gen: NAD  Pulm: No respiratory distress  Abd: soft, nontender, nondistended.   Back: mild R CVAT, no L CVAT  : + penile prosthesis without tenderness along cylinders. circumcised penis. testes descended BL and nontender. no tenderness over IPP pump in scrotum. no scrotal swelling or skin changes.   Chaperoned by Dr. Dharmesh Hidalgo with consent for exam verbally given by patient.       LABS:                    14.2   9.73  )-----------( 143      ( 25 Aug 2024 06:11 )             42.8     08-25    138  |  106  |  13  ----------------------------<  112<H>  4.3   |  21<L>  |  1.08    Ca    9.0      25 Aug 2024 06:11    TPro  6.1  /  Alb  3.1<L>  /  TBili  0.3  /  DBili  x   /  AST  24  /  ALT  32  /  AlkPhos  56  08-25    PT/INR - ( 24 Aug 2024 17:41 )   PT: 11.5 sec;   INR: 1.10 ratio    PTT - ( 24 Aug 2024 17:41 )  PTT:31.4 sec    Urinalysis (08.24.24 @ 17:41)    Glucose Qualitative, Urine: Negative mg/dL   Blood, Urine: Non Hemolyzed Trace   pH Urine: 8.0   Color: Yellow   Urine Appearance: Clear   Bilirubin: Negative   Ketone - Urine: Trace mg/dL   Specific Gravity: 1.018   Protein, Urine: 100 mg/dL   Urobilinogen: 2.0 mg/dL   Nitrite: Negative   Leukocyte Esterase Concentration: Trace    Urine Cx: Culture - Urine (08.23.24 @ 19:20)    Specimen Source: Clean Catch Clean Catch (Midstream)   Culture Results:   >100,000 CFU/ml Escherichia coli    Blood Cx: pending    RADIOLOGY:  < from: CT Abdomen and Pelvis w/ IV Cont (08.25.24 @ 12:54) >  ACC: [id number] EXAM:  CT ABDOMEN AND PELVIS IC   ORDERED BY: JOCELYN CORREA     PROCEDURE DATE:  08/25/2024          INTERPRETATION:  CLINICAL INFORMATION: Flank pain.    COMPARISON: None.    CONTRAST/COMPLICATIONS:  IV Contrast: Omnipaque 350  90 cc administered   10 cc discarded  Oral Contrast: NONE  Complications: None reported at time of study completion    PROCEDURE:  CT of the Abdomen and Pelvis was performed.  Sagittal and coronal reformats were performed.    FINDINGS:  LOWER CHEST: Trace right pleural effusion. Bibasilar atelectasis.    LIVER: Within normal limits.  BILE DUCTS: Normal caliber.  GALLBLADDER: Within normal limits.  SPLEEN: Within normal limits.  PANCREAS: Within normal limits.  ADRENALS: Within normal limits.  KIDNEYS/URETERS: Patchy cortical hypodensities throughout the right   kidney. Mild right hydronephrosis to the level of the bladder, where the   ureter is reimplanted anteriorly. There is mild right ureteral   enhancement and periureteral fat stranding.    BLADDER: Minimally distended. Diffuse bladder wall trabeculation.  REPRODUCTIVE ORGANS: Prostatectomy. Penile prosthesis, with balloon   reservoir in the left lower quadrant.    BOWEL: No bowel obstruction. Appendix is normal. Colonic diverticulosis  without evidence of acute diverticulitis.  PERITONEUM/RETROPERITONEUM: Within normal limits.  VESSELS: Atherosclerotic changes.  LYMPH NODES: No lymphadenopathy.  ABDOMINAL WALL: Within normal limits.  BONES: Degenerative changes.    IMPRESSION:  1.  Patchy cortical hypodensities throughout the right kidney, consistent   with acute right pyelonephritis. No renal abscess.    2.  Mild right hydroureteronephrosis to the level of the bladder, where   the right ureter is reimplanted anteriorly. No obstructive stone.        --- End of Report ---    < end of copied text >     HPI: 62M with hx of prostate CA s/p prostatectomy with R ureteral reimplant with Dr. Sewell 2008 and RT, ED s/p inflatable penile prosthesis with Dr. Simon presents for worsening R flank pain associated with dysuria, increased frequency and fevers at home to 101 for the past few days.  Pt reports history of recurrent UTIs, about 3 per year, usually managed with oral abx as outpatient. Pt was in ED yesterday with dysuria and sent home with cefpodoxime, but reports worsening flank pain and rigors prompting him to return to ED. Pt denies nausea/vomiting, pain over penile prosthesis, testicular pain, abd pain or other acute complaints at this time. Primary urologist Dr. Simon.    PAST MEDICAL & SURGICAL HISTORY:  Prostate cancer  S/P prostatectomy    MEDICATIONS  (STANDING):  lactated ringers. 1000 milliLiter(s) (125 mL/Hr) IV Continuous <Continuous>    FAMILY HISTORY:  No pertinent family history in first degree relatives      Allergies  penicillins (Other)    SOCIAL HISTORY:    REVIEW OF SYSTEMS: Otherwise negative as stated in HPI    Physical Exam  Vital signs  T(C): 38.4 (08-25-24 @ 11:46), Max: 39.5 (08-25-24 @ 11:44)  HR: 90 (08-25-24 @ 11:44)  BP: 129/80 (08-25-24 @ 11:44)  SpO2: 95% (08-25-24 @ 11:44)    Output    Gen: NAD  Pulm: No respiratory distress  Abd: soft, nontender, nondistended.   Back: mild R CVAT, no L CVAT  : + penile prosthesis without tenderness along cylinders. circumcised penis. testes descended BL and nontender. no tenderness over IPP pump in scrotum. no scrotal swelling or skin changes.   Chaperoned by Dr. Dharmesh Hidalgo with consent for exam verbally given by patient.       LABS:                    14.2   9.73  )-----------( 143      ( 25 Aug 2024 06:11 )             42.8     08-25    138  |  106  |  13  ----------------------------<  112<H>  4.3   |  21<L>  |  1.08    Ca    9.0      25 Aug 2024 06:11    TPro  6.1  /  Alb  3.1<L>  /  TBili  0.3  /  DBili  x   /  AST  24  /  ALT  32  /  AlkPhos  56  08-25    PT/INR - ( 24 Aug 2024 17:41 )   PT: 11.5 sec;   INR: 1.10 ratio    PTT - ( 24 Aug 2024 17:41 )  PTT:31.4 sec    Urinalysis (08.24.24 @ 17:41)    Glucose Qualitative, Urine: Negative mg/dL   Blood, Urine: Non Hemolyzed Trace   pH Urine: 8.0   Color: Yellow   Urine Appearance: Clear   Bilirubin: Negative   Ketone - Urine: Trace mg/dL   Specific Gravity: 1.018   Protein, Urine: 100 mg/dL   Urobilinogen: 2.0 mg/dL   Nitrite: Negative   Leukocyte Esterase Concentration: Trace    Urine Cx: Culture - Urine (08.23.24 @ 19:20)    Specimen Source: Clean Catch Clean Catch (Midstream)   Culture Results:   >100,000 CFU/ml Escherichia coli    Blood Cx: pending    RADIOLOGY:  < from: CT Abdomen and Pelvis w/ IV Cont (08.25.24 @ 12:54) >  ACC: [id number] EXAM:  CT ABDOMEN AND PELVIS IC   ORDERED BY: JOCELYN CORREA     PROCEDURE DATE:  08/25/2024          INTERPRETATION:  CLINICAL INFORMATION: Flank pain.    COMPARISON: None.    CONTRAST/COMPLICATIONS:  IV Contrast: Omnipaque 350  90 cc administered   10 cc discarded  Oral Contrast: NONE  Complications: None reported at time of study completion    PROCEDURE:  CT of the Abdomen and Pelvis was performed.  Sagittal and coronal reformats were performed.    FINDINGS:  LOWER CHEST: Trace right pleural effusion. Bibasilar atelectasis.    LIVER: Within normal limits.  BILE DUCTS: Normal caliber.  GALLBLADDER: Within normal limits.  SPLEEN: Within normal limits.  PANCREAS: Within normal limits.  ADRENALS: Within normal limits.  KIDNEYS/URETERS: Patchy cortical hypodensities throughout the right   kidney. Mild right hydronephrosis to the level of the bladder, where the   ureter is reimplanted anteriorly. There is mild right ureteral   enhancement and periureteral fat stranding.    BLADDER: Minimally distended. Diffuse bladder wall trabeculation.  REPRODUCTIVE ORGANS: Prostatectomy. Penile prosthesis, with balloon   reservoir in the left lower quadrant.    BOWEL: No bowel obstruction. Appendix is normal. Colonic diverticulosis  without evidence of acute diverticulitis.  PERITONEUM/RETROPERITONEUM: Within normal limits.  VESSELS: Atherosclerotic changes.  LYMPH NODES: No lymphadenopathy.  ABDOMINAL WALL: Within normal limits.  BONES: Degenerative changes.    IMPRESSION:  1.  Patchy cortical hypodensities throughout the right kidney, consistent   with acute right pyelonephritis. No renal abscess.    2.  Mild right hydroureteronephrosis to the level of the bladder, where   the right ureter is reimplanted anteriorly. No obstructive stone.        --- End of Report ---    < end of copied text >

## 2024-08-25 NOTE — PATIENT PROFILE ADULT - FALL HARM RISK - UNIVERSAL INTERVENTIONS
Bed in lowest position, wheels locked, appropriate side rails in place/Call bell, personal items and telephone in reach/Instruct patient to call for assistance before getting out of bed or chair/Non-slip footwear when patient is out of bed/[place] to call system/Physically safe environment - no spills, clutter or unnecessary equipment/Purposeful Proactive Rounding/Room/bathroom lighting operational, light cord in reach

## 2024-08-25 NOTE — H&P ADULT - HISTORY OF PRESENT ILLNESS
64m hx recurrent UTI's, prostate ca s/p prostatectomy, HLD presenting with back pain. Was diagnosed with UTI in ED day prior to admission, sent home on cefpodoxime but developed worsening back pain, rigors so returned to ED. Monitored in CDU and underwent CT a/p which showed peylonephritis and mild R hydronephrosis. At present, pt notes significant improvement in back pain, no further fevers/chills. No N/V/D. No cp,palpitations, no sob/cough. ROS otherwise negative.

## 2024-08-25 NOTE — ED CDU PROVIDER DISPOSITION NOTE - ATTENDING APP SHARED VISIT CONTRIBUTION OF CARE
Attending Emergency Physician- Ricardo Strickland MD, FACEP Attending Emergency Physician- Ricardo Strickland MD, FACEP if patient to be discharged this evening, planning was discussed as well as expectant management and strict return precautions.   Patient  understands anticipatory guidance and was given strict return and follow up precautions. The patient has been informed of all concerning signs and symptoms to return to Emergency Department, the necessity to follow up with PMD/Clinic/follow up provided within 2-3 days was explained, and the patient reports understanding of above with capacity and insight if patient disposition is to be home.   Pending ID consultation patient may require admission if recommended by their expertise pending urine cultures.

## 2024-08-25 NOTE — CONSULT NOTE ADULT - ASSESSMENT
"62M with hx of prostate CA 2008 with adjuvant radiation 2009, ED s/p inflatable penile prosthesis, presents for worsening back pain. Per pt, he has multiple UTI, however, no culture in our system. Seen in this ED yesterday for dysuria. Dx with UTI. Given CTX at ED, d/c home with cefpodoxime. Reports episode of rigors and worsening back pain overnight. Return to ED for further work up. +fever, chills, back pain. nausea. No vomiting. no dysuria."    ED course: T max 102.9, WBC 16-->9    -->17 WBC, Ucx E coli      # Complicated UTI  # h/p penile prosthesis    - Switch Ceftriaxone to 1 gm Q24h  - Can stop Nitrofurantoin  - follow up blood and final Ucx  - Check CT a/P        All recommendations are tentative pending Attending Attestation.    Andrew Lindsey MD, PGY-5  ID Fellow  [employer] Teams Preferred  After 5pm/weekends call [phone]     "62M with hx of prostate CA 2008 with adjuvant radiation 2009, ED s/p inflatable penile prosthesis, presents for worsening back pain. Per pt, he has multiple UTI, however, no culture in our system. Seen in this ED yesterday for dysuria. Dx with UTI. Given CTX at ED, d/c home with cefpodoxime. Reports episode of rigors and worsening back pain overnight. Return to ED for further work up. +fever, chills, back pain. nausea. No vomiting. no dysuria."    ED course: T max 102.9, WBC 16-->9    -->17 WBC, Ucx E coli      # Complicated UTI  # h/p recurrent UTI/prostate ca/penile prosthesis    - Switch Ceftriaxone to 1 gm Q24h  - Can stop Nitrofurantoin  - follow up blood and final Ucx  - Check CT a/P      Case d/w Attending and Primary team.     Andrew Lindsey MD, PGY-5  ID Fellow  [employer] Teams Preferred  After 5pm/weekends call [phone]

## 2024-08-25 NOTE — ED CDU PROVIDER INITIAL DAY NOTE - CPE EDP RESP NORM
Detail Level: Generalized
Patient Specific Counseling (Will Not Stick From Patient To Patient): Patient has unrelenting pruritus.\\nPrevious biopsy results from previous dermatologist noted with atopic dermatitis \\nHas been recommended to try Dupixient in the past. Will attempt approval if no interactions are noted.
normal...

## 2024-08-25 NOTE — ED CDU PROVIDER DISPOSITION NOTE - CLINICAL COURSE
62 M with hx of prostate CA 2008 with adjuvant radiation 2009, ED s/p inflatable penile prosthesis, UTIs presents for worsening back pain. Seen in this ED yesterday for dysuria, dx with UTI. Given Ceftriaxone in the ED, d/c home with cefpodoxime. Reports episode of rigors and worsening back pain overnight. Return to ED for further work up. Reports fevers, chills, back pain (right side worse than left), dysuria. Denies vomiting, hematuria. Denies history of heart failure.   ED course: Febrile. WBC 10.08, UA with WBC/RBC. Started on Ciprofloxacin. Plan to continue antibiotics, hydration, and symptom management in CDU. 62 M with hx of prostate CA 2008 with adjuvant radiation 2009, ED s/p inflatable penile prosthesis, UTIs presents for worsening back pain. Seen in this ED yesterday for dysuria, dx with UTI. Given Ceftriaxone in the ED, d/c home with cefpodoxime. Reports episode of rigors and worsening back pain overnight. Return to ED for further work up. Reports fevers, chills, back pain (right side worse than left), dysuria. Denies vomiting, hematuria. Denies history of heart failure.   ED course: Febrile. WBC 10.08, UA with WBC/RBC. Started on Ciprofloxacin. Plan to continue antibiotics, hydration, and symptom management in CDU.  In CDU patient treated with abx. switched from cipro to ceftriaxone and macrobid. seen by ID. recommend dc Macrobid and continue ceftriaxone. CT showing both pyelo and hydro. patient to be admitted to medicine for further eval and management. discussed with Dr. Strickland.

## 2024-08-25 NOTE — ED ADULT NURSE REASSESSMENT NOTE - NS ED NURSE REASSESS COMMENT FT1
1816 Pt given antibiotics as ordered and fluids  Blood sent Pt wife asking for a CT scan and urology consult as per Pt urologist who has no Privileges Md aware Pt has 100.4 Temp Pt took extra strenth Tylenol at 3 pm today  MPRN
Pt received from [facility] Cardoso P[facility]. Pt oriented to CDU & plan of care was discussed. Pt A&O x 4. Ambulatory. Pt in CDU for IV antibiotic, IV hydration and pain control. V/S stable, pt febrile,  IV in place, patent and free of signs of infiltration. Pt resting in bed. Safety & comfort measures maintained. Call bell in reach. Care continues.    Lactated Ringers running 125ml/hr via pump. For fever patient received IV Tylenol and IV Toradol.
07.00 Received the Pt from  ALEXYS Hunter Pt is Observed for UTI. Received the Pt A&OX 4  Pt obeys commands Heather N/V/D fever chills cp SOB   Comfort care & safety measures continued  IV site looks clean & dry no signs of infiltration noted pt denies  pain IV site .Pt is oriented to the unit Plan of care explained .  Pt is advised to call for help  call bell with in the reach pt verbalized the understanding .  pending CDU  MD cottrell . GCS 15/15 A&OX 4 PERRLA  size 3 Strong upper & lower extremities steady gait  Pt is ambulatory & independent   No facial droop  No Hand Leg drop denies numbness tingling  Pt states he feels better denies urinary symptoms now  Plan of care ongoing

## 2024-08-25 NOTE — H&P ADULT - ASSESSMENT
64m hx HLD, prostate ca s/p prostatectomy presenting with recurrent UTI found to have pyelonephritis

## 2024-08-25 NOTE — ED CDU PROVIDER SUBSEQUENT DAY NOTE - PROGRESS NOTE DETAILS
ID consulted. awaiting call back. patient seen and examined at bedside. states feels better with IV abx vs PO cefpodoxime. resting comfortably at this time. will dc cipro at this time and switch patient to ceftriaxone 1g IV BID and macrobid PO q12h. pending culture results. discussed with Dr. Strickland. patient with reoccurrence of fever of 103.1 F. Tylenol and ofirmev ordered. patient states feeling chills again at this time. CT scan ordered and expedited to r/o stone. urology consulted will come see patient. ID at bedside. will continue to monitor. discussed with Dr. Strickland. patient with reoccurrence of fever of 101.1 F. Tylenol and ofirmev ordered. patient states feeling chills again at this time. CT scan ordered and expedited to r/o stone. urology consulted will come see patient. ID at bedside. will continue to monitor. discussed with Dr. Strickland. spoke with ID. recommend 1g ceftriaxone IV once a day and dc macrobid. agree with plan for admission. pending CT results at this time to assess for infected stone vs other acute pathology, urology eval. discussed with ED attending. imaging reviewed. no signs of infected stones. pyelo and hydro noted. will admit patient to medicine for further eval and management. discussed with Dr. Strickland.

## 2024-08-25 NOTE — H&P ADULT - MENTAL STATUS
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 Her pain was unrelieved by her home percocet. She denies any symptoms of infection although she says she was sick 1-2 weeks ago with flu like symptoms but has recovered. She is having some shortness of breath also but denies chest pain or cough. On arrival she is stable and satting well on room air. Interestingly hemoglobin appears to be at baseline and retic count normal but patient does have significant iron and folate deficiency which could cause reticulocyte count to be low. I have low suspicion for acute chest at this time and she does not appear to have infection.  -LE U/S showed no DVT  -Infectious workup: UA without evidence of infection, CXR without focal consolidation. Follow up blood cultures, respiratory infection panel, influenza test  -Hemoglobin electrophoresis consistent with sickle cell SC disease with beta thal.  Similar in numbers to previous testing less than a year ago.  Pathology smear showed no signs of increased sickling.   -Heme/onc consulted who does not recommend exchange transfusion at the time time.  Rec to continue IVF and pain control    Plan:  - Pain control: Dilaudid pump at 0.4mg/ bolus allowed every 10 min (max of 2.4mg/hour)    Decreased scheduled Oxycodone to 10mg long acting q12h   - Plan to d/c PCA pump tomorrow and deescalate to PO pain control  - continue acetaminophen to 1g q6h scheduled  - continue gabapentin to 800mg TID  - Daily reticulocyte count    - Folic acid supplementation   ao x 3

## 2024-08-25 NOTE — CONSULT NOTE ADULT - ASSESSMENT
63 y/o M with hx of prostate CA s/p prostatectomy with Dr. Sewell and RT, ED s/p inflatable penile prosthesis with Dr. Simon worsening R flank pain associated with dysuria, increased frequency and fevers with concern for R pyelonephritis.   Febrile in ED to 103.1, other VSS. WBC 9.7, Cr 1.08, UCx with E coli. CT shows R pyelonephritis and mild R hydronephrosis to level of the bladder.    Recs:  - broad spectrum antibiotics   - follow up urine and blood culture  - check post void residual by bladder scan  - begin Flomax  - trend fever curve  - needs recurrent UTI workup outpatient with Dr. Simon or Dr. Sewell  - Discussed with Dr. Nino     63 y/o M with hx of prostate CA s/p prostatectomy with R ureteral reimplant with Dr. Sewell 2008 and RT, ED s/p inflatable penile prosthesis with Dr. Simon worsening R flank pain associated with dysuria, increased frequency and fevers with concern for R pyelonephritis. Febrile in ED to 103.1, other VSS. WBC 9.7, Cr 1.08, UCx with E coli. CT shows R pyelonephritis and mild R hydronephrosis to level of the bladder.    Recs:  - broad spectrum antibiotics   - follow up urine and blood culture  - check post void residual by bladder scan  - begin Flomax  - trend fever curve  - needs recurrent UTI workup outpatient with Dr. Simon or Dr. Sewell  - Discussed with Dr. Nino     61 y/o M with hx of prostate CA s/p prostatectomy with R ureteral reimplant with Dr. Sewell 2008 and RT, ED s/p inflatable penile prosthesis with Dr. Simon worsening R flank pain associated with dysuria, increased frequency and fevers with concern for R pyelonephritis. Febrile in ED to 103.1, other VSS. WBC 9.7, Cr 1.08, UCx with E coli. CT shows R pyelonephritis and mild R hydronephrosis to level of the bladder.    Patient likely has R pyelonephritis as suggested on imaging, likely predisposed from lack of anti-reflux mechanism in re-implanted R ureter. No concern for infected penile prosthesis. No urologic intervention indicated at this time    Recs:  - broad spectrum antibiotics   - follow up urine and blood culture  - check post void residual by bladder scan  - begin Flomax  - trend fever curve  - needs recurrent UTI workup outpatient with Dr. Simon or Dr. Sewell  - Discussed with Dr. Trung [facility]  [address] [place] [address]  [phone]

## 2024-08-25 NOTE — ED CDU PROVIDER SUBSEQUENT DAY NOTE - CLINICAL SUMMARY MEDICAL DECISION MAKING FREE TEXT BOX
Patient with uti and rigors with right flank pain, initially treated with 2g of ceftriaxone on 8/23 at 7pm and discharged on cefpodoxime 200mg bid which he took two tablets of and returned 8/24 at 1700 secondary to rigors and fatigue with right flank pain. Patient has improved after he has been in the emergency department overnight. Cipro was started yesterday and patient received two dosages initiated yesterday at 1800 and today around 0500. Ecoli with 67 sensitivity on our Emergency Department antibiogram and ceftriaxone in the realm of 83, will give iv ceftriaxone bid and add nitrofurantoin bid. Pending sensitivities from lab due this evening or tomorrow am, microbiology called at 0900 [phone] ->* to discuss expedition of results and an email was sent as per  to assist with call back requested to our CDU PA team. Will continue to monitor, give fluids, reassess and await ID recommendations.

## 2024-08-25 NOTE — ED CDU PROVIDER INITIAL DAY NOTE - PROGRESS NOTE DETAILS
patient seen and examined at bedside. states feels better with IV abx vs PO cefpodoxime. resting comfortably at this time. will dc cipro at this time and switch patient to ceftriaxone 1g IV BID and macrobid PO q12h. pending culture results. discussed with Dr. Strickland. ID consulted. awaiting call back.

## 2024-08-25 NOTE — ED CDU PROVIDER INITIAL DAY NOTE - OBJECTIVE STATEMENT
62 M with hx of prostate CA 2008 with adjuvant radiation 2009, ED s/p inflatable penile prosthesis, UTIs presents for worsening back pain. Seen in this ED yesterday for dysuria, dx with UTI. Given Ceftriaxone in the ED, d/c home with cefpodoxime. Reports episode of rigors and worsening back pain overnight. Return to ED for further work up. Reports fevers, chills, back pain (right side worse than left), dysuria. Denies vomiting, hematuria. Denies history of heart failure.   ED course: Febrile. WBC 10.08, UA with WBC/RBC. Started on Ciprofloxacin. Plan to continue antibiotics, hydration, and symptom management in CDU.

## 2024-08-25 NOTE — H&P ADULT - PROBLEM SELECTOR PLAN 3
resume repatha/icosapent as OP    dispo: likely home pending clinical progress, final abx plan, urology clearance    The necessity of the time spent during the encounter on this date of service was due to:   - Ordering, reviewing, and interpreting labs, testing, and imaging.  - Independently obtaining a review of systems and performing a physical exam  - Reviewing prior hospitalization and where necessary, outpatient records.  - Counselling and educating patient and family regarding interpretation of aforementioned items and plan of care.    Time-based billing (NON-critical care). Total minutes spent: 78

## 2024-08-25 NOTE — ED CDU PROVIDER SUBSEQUENT DAY NOTE - NS ED ROS FT
· CONSTITUTIONAL: - - -  · Constitutional [+]: CHILLS, FEVER  · ROS STATEMENT: all other ROS negative except as per HPI

## 2024-08-25 NOTE — CONSULT NOTE ADULT - ATTENDING COMMENTS
62 M with hx of prostate CA 2008 with adjuvant radiation 2009, ED s/p inflatable penile prosthesis, presents for worsening back pain. Per pt, he has multiple UTI, however, no culture in our system. Seen in this ED 8/23 for dysuria. Dx with UTI. Given CTX at ED, d/c home with cefpodoxime. Reports episode of rigors and worsening back pain overnight. Return to ED for further work up. +fever, chills, back pain. nausea. No vomiting. no dysuria."    ED course: T max 102.9, WBC 16-->9    -->17 WBC, Ucx E coli      # Complicated UTI  # h/p penile prosthesis    - Switch Ceftriaxone to 1 gm Q24h  - Can stop Nitrofurantoin  - follow up blood and final Ucx - in lab  - Check CT a/P - done results pending  - trend fever curve and wbc    ID will follow    Kaila Meléndez MD  [phone] (pager)  [phone] (office)
Agree with assessment and plan.  Need outpatient evaluation for possible recurrent UTI    Bryant Nino MD  [address] [facility]  [address]  [place] [address]  [phone]

## 2024-08-25 NOTE — ED CDU PROVIDER INITIAL DAY NOTE - ATTENDING APP SHARED VISIT CONTRIBUTION OF CARE
62M with hx of prostate CA 2008 with adjuvant radiation 2009, ED s/p inflatable penile prosthesis, presents With fever and chills.  Patient reports he was seen and evaluated in the emergency department yesterday, diagnosed with a urinary tract infection and sent home with cefpodoxime.  Patient states his dysuria has improved but continues to have fever and chills. Patient follows with Dr. Simon for urology, was prompted to go to the emergency department. Denies chest pain, shortness of breath, Denies pain, nausea, vomiting, diarrhea, dysuria, hematuria, Testicular swelling, testicular pain, penile discharge.    Physical Exam:  Gen: NAD, awake and alert, non-toxic appearing  HEENT: Atraumatic, oropharynx clear, moist mucous membranes, normal conjunctiva  Cardio: RRR, no murmurs, rubs or gallops  Lung: CTAB, no respiratory distress, no wheezes/rhonchi/rales B/L  Abd: soft, NT, ND, no guarding, no rigidity, no rebound tenderness, no CVA tenderness     Patient presents with fever and chills in setting of presumably failed outpatient UTI treatment. Vitals within normal limits, hemodynamically stable, exam as above.  Low suspicion for pyelonephritis, nephrolithiasis (no colicky pain, no CVAT). Leukocytosis improved from initial ED visit. UA still revealing positive. will continue with ciprofloxacin, possible CT and consult with urology.

## 2024-08-25 NOTE — ED CDU PROVIDER DISPOSITION NOTE - NSFOLLOWUPINSTRUCTIONS_ED_ALL_ED_FT
Hydrate.     Please take Tylenol 650mg and/or Motrin 600mg every 6 hours as needed for pain/fever.     We recommend you follow up with your primary care provider within the next 2-3 days, please bring all of your results with you. You can also follow up with your urologist next week.    Please return to the Emergency Department with new, worsening, or concerning symptoms, such as:  -Worsening, severe pain  -Inability to urinate   -Shortness of breath or trouble breathing  -Pressure, pain, tightness in chest  -Facial drooping, arm weakness, or speech difficulty     *More detailed information regarding your visit and discharge can be found by reviewing this packet

## 2024-08-26 LAB
ALBUMIN SERPL ELPH-MCNC: 3.1 G/DL — LOW (ref 3.3–5)
ALP SERPL-CCNC: 81 U/L — SIGNIFICANT CHANGE UP (ref 40–120)
ALT FLD-CCNC: 46 U/L — HIGH (ref 10–45)
ANION GAP SERPL CALC-SCNC: 13 MMOL/L — SIGNIFICANT CHANGE UP (ref 5–17)
ANISOCYTOSIS BLD QL: SLIGHT — SIGNIFICANT CHANGE UP
AST SERPL-CCNC: 43 U/L — HIGH (ref 10–40)
BASOPHILS # BLD AUTO: 0 K/UL — SIGNIFICANT CHANGE UP (ref 0–0.2)
BASOPHILS NFR BLD AUTO: 0 % — SIGNIFICANT CHANGE UP (ref 0–2)
BILIRUB SERPL-MCNC: 0.3 MG/DL — SIGNIFICANT CHANGE UP (ref 0.2–1.2)
BUN SERPL-MCNC: 14 MG/DL — SIGNIFICANT CHANGE UP (ref 7–23)
CALCIUM SERPL-MCNC: 8.8 MG/DL — SIGNIFICANT CHANGE UP (ref 8.4–10.5)
CHLORIDE SERPL-SCNC: 104 MMOL/L — SIGNIFICANT CHANGE UP (ref 96–108)
CO2 SERPL-SCNC: 22 MMOL/L — SIGNIFICANT CHANGE UP (ref 22–31)
CREAT SERPL-MCNC: 1.04 MG/DL — SIGNIFICANT CHANGE UP (ref 0.5–1.3)
DACRYOCYTES BLD QL SMEAR: SLIGHT — SIGNIFICANT CHANGE UP
EGFR: 81 ML/MIN/1.73M2 — SIGNIFICANT CHANGE UP
ELLIPTOCYTES BLD QL SMEAR: SLIGHT — SIGNIFICANT CHANGE UP
EOSINOPHIL # BLD AUTO: 0.09 K/UL — SIGNIFICANT CHANGE UP (ref 0–0.5)
EOSINOPHIL NFR BLD AUTO: 0.9 % — SIGNIFICANT CHANGE UP (ref 0–6)
GLUCOSE SERPL-MCNC: 90 MG/DL — SIGNIFICANT CHANGE UP (ref 70–99)
HCT VFR BLD CALC: 38.4 % — LOW (ref 39–50)
HGB BLD-MCNC: 13.3 G/DL — SIGNIFICANT CHANGE UP (ref 13–17)
LYMPHOCYTES # BLD AUTO: 1.29 K/UL — SIGNIFICANT CHANGE UP (ref 1–3.3)
LYMPHOCYTES # BLD AUTO: 13.3 % — SIGNIFICANT CHANGE UP (ref 13–44)
MANUAL SMEAR VERIFICATION: SIGNIFICANT CHANGE UP
MCHC RBC-ENTMCNC: 31.7 PG — SIGNIFICANT CHANGE UP (ref 27–34)
MCHC RBC-ENTMCNC: 34.6 GM/DL — SIGNIFICANT CHANGE UP (ref 32–36)
MCV RBC AUTO: 91.6 FL — SIGNIFICANT CHANGE UP (ref 80–100)
METAMYELOCYTES # FLD: 0.9 % — HIGH (ref 0–0)
MONOCYTES # BLD AUTO: 1.29 K/UL — HIGH (ref 0–0.9)
MONOCYTES NFR BLD AUTO: 13.3 % — SIGNIFICANT CHANGE UP (ref 2–14)
NEUTROPHILS # BLD AUTO: 6.92 K/UL — SIGNIFICANT CHANGE UP (ref 1.8–7.4)
NEUTROPHILS NFR BLD AUTO: 69 % — SIGNIFICANT CHANGE UP (ref 43–77)
NEUTS BAND # BLD: 2.6 % — SIGNIFICANT CHANGE UP (ref 0–8)
PLAT MORPH BLD: NORMAL — SIGNIFICANT CHANGE UP
PLATELET # BLD AUTO: 176 K/UL — SIGNIFICANT CHANGE UP (ref 150–400)
POIKILOCYTOSIS BLD QL AUTO: SLIGHT — SIGNIFICANT CHANGE UP
POLYCHROMASIA BLD QL SMEAR: SLIGHT — SIGNIFICANT CHANGE UP
POTASSIUM SERPL-MCNC: 4 MMOL/L — SIGNIFICANT CHANGE UP (ref 3.5–5.3)
POTASSIUM SERPL-SCNC: 4 MMOL/L — SIGNIFICANT CHANGE UP (ref 3.5–5.3)
PROT SERPL-MCNC: 6.3 G/DL — SIGNIFICANT CHANGE UP (ref 6–8.3)
RBC # BLD: 4.19 M/UL — LOW (ref 4.2–5.8)
RBC # FLD: 13.2 % — SIGNIFICANT CHANGE UP (ref 10.3–14.5)
RBC BLD AUTO: ABNORMAL
SODIUM SERPL-SCNC: 139 MMOL/L — SIGNIFICANT CHANGE UP (ref 135–145)
WBC # BLD: 9.67 K/UL — SIGNIFICANT CHANGE UP (ref 3.8–10.5)
WBC # FLD AUTO: 9.67 K/UL — SIGNIFICANT CHANGE UP (ref 3.8–10.5)

## 2024-08-26 PROCEDURE — 99232 SBSQ HOSP IP/OBS MODERATE 35: CPT

## 2024-08-26 PROCEDURE — 99231 SBSQ HOSP IP/OBS SF/LOW 25: CPT

## 2024-08-26 RX ORDER — FINASTERIDE 1 MG/1
5 TABLET, FILM COATED ORAL DAILY
Refills: 0 | Status: DISCONTINUED | OUTPATIENT
Start: 2024-08-26 | End: 2024-08-27

## 2024-08-26 RX ORDER — ENOXAPARIN SODIUM 100 MG/ML
40 INJECTION SUBCUTANEOUS EVERY 24 HOURS
Refills: 0 | Status: DISCONTINUED | OUTPATIENT
Start: 2024-08-26 | End: 2024-08-27

## 2024-08-26 RX ADMIN — ENOXAPARIN SODIUM 40 MILLIGRAM(S): 100 INJECTION SUBCUTANEOUS at 15:21

## 2024-08-26 RX ADMIN — Medication 125 MILLILITER(S): at 21:17

## 2024-08-26 RX ADMIN — ACETAMINOPHEN 650 MILLIGRAM(S): 325 TABLET ORAL at 02:12

## 2024-08-26 RX ADMIN — TAMSULOSIN HYDROCHLORIDE 0.4 MILLIGRAM(S): 0.4 CAPSULE ORAL at 21:17

## 2024-08-26 RX ADMIN — ACETAMINOPHEN 650 MILLIGRAM(S): 325 TABLET ORAL at 01:42

## 2024-08-26 RX ADMIN — ACETAMINOPHEN 650 MILLIGRAM(S): 325 TABLET ORAL at 15:25

## 2024-08-26 RX ADMIN — ACETAMINOPHEN 650 MILLIGRAM(S): 325 TABLET ORAL at 16:20

## 2024-08-26 RX ADMIN — ACETAMINOPHEN 650 MILLIGRAM(S): 325 TABLET ORAL at 23:46

## 2024-08-26 RX ADMIN — ACETAMINOPHEN 650 MILLIGRAM(S): 325 TABLET ORAL at 09:45

## 2024-08-26 RX ADMIN — ACETAMINOPHEN 650 MILLIGRAM(S): 325 TABLET ORAL at 08:48

## 2024-08-26 RX ADMIN — FINASTERIDE 5 MILLIGRAM(S): 1 TABLET, FILM COATED ORAL at 15:20

## 2024-08-26 RX ADMIN — Medication 100 MILLIGRAM(S): at 08:47

## 2024-08-26 RX ADMIN — Medication 125 MILLILITER(S): at 13:01

## 2024-08-26 NOTE — PROGRESS NOTE ADULT - ATTENDING COMMENTS
Patient seen and examined. Agree with assessment and plan.   Outpatient evaluation for recurrent UTI

## 2024-08-26 NOTE — PROVIDER CONTACT NOTE (OTHER) - ACTION/TREATMENT ORDERED:
Cold packs provided, oral hydration encouraged. Pt had Tylenol po at 19:38, Will repeat temp at 2300

## 2024-08-27 ENCOUNTER — TRANSCRIPTION ENCOUNTER (OUTPATIENT)
Age: 64
End: 2024-08-27

## 2024-08-27 VITALS
DIASTOLIC BLOOD PRESSURE: 92 MMHG | RESPIRATION RATE: 18 BRPM | HEART RATE: 73 BPM | OXYGEN SATURATION: 98 % | SYSTOLIC BLOOD PRESSURE: 164 MMHG | TEMPERATURE: 98 F

## 2024-08-27 LAB
ANION GAP SERPL CALC-SCNC: 15 MMOL/L — SIGNIFICANT CHANGE UP (ref 5–17)
BUN SERPL-MCNC: 16 MG/DL — SIGNIFICANT CHANGE UP (ref 7–23)
CALCIUM SERPL-MCNC: 9.1 MG/DL — SIGNIFICANT CHANGE UP (ref 8.4–10.5)
CHLORIDE SERPL-SCNC: 104 MMOL/L — SIGNIFICANT CHANGE UP (ref 96–108)
CO2 SERPL-SCNC: 21 MMOL/L — LOW (ref 22–31)
CREAT SERPL-MCNC: 0.99 MG/DL — SIGNIFICANT CHANGE UP (ref 0.5–1.3)
EGFR: 86 ML/MIN/1.73M2 — SIGNIFICANT CHANGE UP
GLUCOSE SERPL-MCNC: 85 MG/DL — SIGNIFICANT CHANGE UP (ref 70–99)
HCT VFR BLD CALC: 39.3 % — SIGNIFICANT CHANGE UP (ref 39–50)
HGB BLD-MCNC: 13.4 G/DL — SIGNIFICANT CHANGE UP (ref 13–17)
MCHC RBC-ENTMCNC: 31.7 PG — SIGNIFICANT CHANGE UP (ref 27–34)
MCHC RBC-ENTMCNC: 34.1 GM/DL — SIGNIFICANT CHANGE UP (ref 32–36)
MCV RBC AUTO: 92.9 FL — SIGNIFICANT CHANGE UP (ref 80–100)
NRBC # BLD: 0 /100 WBCS — SIGNIFICANT CHANGE UP (ref 0–0)
PLATELET # BLD AUTO: 205 K/UL — SIGNIFICANT CHANGE UP (ref 150–400)
POTASSIUM SERPL-MCNC: 4 MMOL/L — SIGNIFICANT CHANGE UP (ref 3.5–5.3)
POTASSIUM SERPL-SCNC: 4 MMOL/L — SIGNIFICANT CHANGE UP (ref 3.5–5.3)
RBC # BLD: 4.23 M/UL — SIGNIFICANT CHANGE UP (ref 4.2–5.8)
RBC # FLD: 13.3 % — SIGNIFICANT CHANGE UP (ref 10.3–14.5)
SODIUM SERPL-SCNC: 140 MMOL/L — SIGNIFICANT CHANGE UP (ref 135–145)
WBC # BLD: 7.97 K/UL — SIGNIFICANT CHANGE UP (ref 3.8–10.5)
WBC # FLD AUTO: 7.97 K/UL — SIGNIFICANT CHANGE UP (ref 3.8–10.5)

## 2024-08-27 PROCEDURE — 74177 CT ABD & PELVIS W/CONTRAST: CPT | Mod: MC

## 2024-08-27 PROCEDURE — 96375 TX/PRO/DX INJ NEW DRUG ADDON: CPT

## 2024-08-27 PROCEDURE — 82947 ASSAY GLUCOSE BLOOD QUANT: CPT

## 2024-08-27 PROCEDURE — 81001 URINALYSIS AUTO W/SCOPE: CPT

## 2024-08-27 PROCEDURE — 87040 BLOOD CULTURE FOR BACTERIA: CPT

## 2024-08-27 PROCEDURE — 82330 ASSAY OF CALCIUM: CPT

## 2024-08-27 PROCEDURE — 85730 THROMBOPLASTIN TIME PARTIAL: CPT

## 2024-08-27 PROCEDURE — 80053 COMPREHEN METABOLIC PANEL: CPT

## 2024-08-27 PROCEDURE — 96374 THER/PROPH/DIAG INJ IV PUSH: CPT

## 2024-08-27 PROCEDURE — 87086 URINE CULTURE/COLONY COUNT: CPT

## 2024-08-27 PROCEDURE — 85018 HEMOGLOBIN: CPT

## 2024-08-27 PROCEDURE — 85025 COMPLETE CBC W/AUTO DIFF WBC: CPT

## 2024-08-27 PROCEDURE — 99232 SBSQ HOSP IP/OBS MODERATE 35: CPT

## 2024-08-27 PROCEDURE — 99285 EMERGENCY DEPT VISIT HI MDM: CPT

## 2024-08-27 PROCEDURE — 85610 PROTHROMBIN TIME: CPT

## 2024-08-27 PROCEDURE — 82435 ASSAY OF BLOOD CHLORIDE: CPT

## 2024-08-27 PROCEDURE — 80048 BASIC METABOLIC PNL TOTAL CA: CPT

## 2024-08-27 PROCEDURE — 84132 ASSAY OF SERUM POTASSIUM: CPT

## 2024-08-27 PROCEDURE — 82803 BLOOD GASES ANY COMBINATION: CPT

## 2024-08-27 PROCEDURE — 84295 ASSAY OF SERUM SODIUM: CPT

## 2024-08-27 PROCEDURE — 85014 HEMATOCRIT: CPT

## 2024-08-27 PROCEDURE — 85027 COMPLETE CBC AUTOMATED: CPT

## 2024-08-27 PROCEDURE — 83605 ASSAY OF LACTIC ACID: CPT

## 2024-08-27 RX ORDER — TAMSULOSIN HYDROCHLORIDE 0.4 MG/1
1 CAPSULE ORAL
Qty: 30 | Refills: 0
Start: 2024-08-27 | End: 2024-09-25

## 2024-08-27 RX ADMIN — Medication 100 MILLIGRAM(S): at 09:08

## 2024-08-27 RX ADMIN — Medication 125 MILLILITER(S): at 07:38

## 2024-08-27 RX ADMIN — ACETAMINOPHEN 650 MILLIGRAM(S): 325 TABLET ORAL at 02:41

## 2024-08-27 RX ADMIN — ACETAMINOPHEN 650 MILLIGRAM(S): 325 TABLET ORAL at 08:35

## 2024-08-27 RX ADMIN — FINASTERIDE 5 MILLIGRAM(S): 1 TABLET, FILM COATED ORAL at 13:02

## 2024-08-27 RX ADMIN — ACETAMINOPHEN 650 MILLIGRAM(S): 325 TABLET ORAL at 07:37

## 2024-08-27 NOTE — DISCHARGE NOTE PROVIDER - NSDCCAREPROVSEEN_GEN_ALL_CORE_FT
Kaila Meléndez Hoorbod Abdulazizorbod Delshadariel  Hoorbod Delshadariel  Hoorbod Delshadfar  Kaila Cason  Ordering Physician  [facility]      [ Greater than 35 min spent for discharge services.   DAISY Mcdowell ]       ( Note written / Date of service is the same as last day of patient stay  in the hospital ( for billing purposes)))

## 2024-08-27 NOTE — DISCHARGE NOTE PROVIDER - NSDCFUSCHEDAPPT_GEN_ALL_CORE_FT
Pernell Simon [facility]  UROLOGY [address]  Scheduled Appointment: 09/09/2024    Pernell Simon  [place] [facility]  UROLOGY [address]  Scheduled Appointment: 09/12/2024     Pernell Simon [facility]  UROLOGY [address]  Scheduled Appointment: 09/19/2024    [place]ell Simon  [place] [facility]  UROLOGY [address]  Scheduled Appointment: 09/30/2024

## 2024-08-27 NOTE — DISCHARGE NOTE PROVIDER - NSDCMRMEDTOKEN_GEN_ALL_CORE_FT
finasteride 1 mg oral tablet: 1 tab(s) orally once a day  Repatha 140 mg/mL subcutaneous solution: 140 milligram(s) subcutaneously every 2 weeks   Cipro 500 mg oral tablet: 1 tab(s) orally 2 times a day  finasteride 1 mg oral tablet: 1 tab(s) orally once a day  Repatha 140 mg/mL subcutaneous solution: 140 milligram(s) subcutaneously every 2 weeks  tamsulosin 0.4 mg oral capsule: 1 cap(s) orally once a day (at bedtime)

## 2024-08-27 NOTE — DISCHARGE NOTE PROVIDER - CARE PROVIDER_API CALL
Abida Santos  [address] [address]  Phone: [phone]  Fax: [phone]  Established Patient  Follow Up Time: 1 week   Abida Santos  [address] [address]  Phone: [phone]  Fax: [phone]  Established Patient  Follow Up Time: 1 week    Pernell Simon  Urology  [address] [place] [id number]  Phone: [phone]  Fax: [phone]  Established Patient  Follow Up Time:

## 2024-08-27 NOTE — DISCHARGE NOTE NURSING/CASE MANAGEMENT/SOCIAL WORK - PATIENT PORTAL LINK FT
You can access the FollowMyHealth Patient Portal offered by [facility] by registering at the following website: [email or web address]. By joining [employer]’s FollowMyHealth portal, you will also be able to view your health information using other applications (apps) compatible with our system.

## 2024-08-27 NOTE — DISCHARGE NOTE PROVIDER - NSDCFUADDAPPT_GEN_ALL_CORE_FT
APPTS ARE READY TO BE MADE: [x ] YES    Best Family or Patient Contact (if needed):    Additional Information about above appointments (if needed):    1: pcp  2:   3:     Other comments or requests:    APPTS ARE READY TO BE MADE: [x ] YES    Best Family or Patient Contact (if needed):    Additional Information about above appointments (if needed):    1: pcp  2:   3:     Other comments or requests:   INTMED-Patient informed us they already have secured a follow up appointment which is not visible on [employer].     URO-Prior to outreaching the patient, it was visible that the patient has secured a follow up appointment which was not scheduled by our team.Appt is on 9/9/24 at 1:00pm with  [address] [place] [address]  [phone]

## 2024-08-27 NOTE — DISCHARGE NOTE PROVIDER - CARE PROVIDERS DIRECT ADDRESSES
,[email or web address]

## 2024-08-27 NOTE — DISCHARGE NOTE NURSING/CASE MANAGEMENT/SOCIAL WORK - NSDCPEFALRISK_GEN_ALL_CORE
For information on Fall & Injury Prevention, visit: [email or web address] OR  [email or web address] OR  https://www.cdc.gov/steadi/patient.html

## 2024-08-27 NOTE — DISCHARGE NOTE NURSING/CASE MANAGEMENT/SOCIAL WORK - NSDCFUADDAPPT_GEN_ALL_CORE_FT
APPTS ARE READY TO BE MADE: [x ] YES    Best Family or Patient Contact (if needed):    Additional Information about above appointments (if needed):    1: pcp  2:   3:     Other comments or requests:

## 2024-08-27 NOTE — DISCHARGE NOTE PROVIDER - PROVIDER TOKENS
PROVIDER:[TOKEN:[[id number]],FOLLOWUP:[1 week],ESTABLISHEDPATIENT:[T]] PROVIDER:[TOKEN:[[id number]],FOLLOWUP:[1 week],ESTABLISHEDPATIENT:[T]],PROVIDER:[TOKEN:[[id number]],ESTABLISHEDPATIENT:[T]]

## 2024-08-27 NOTE — DISCHARGE NOTE PROVIDER - HOSPITAL COURSE
HPI:  64m hx recurrent UTI's, prostate ca s/p prostatectomy, HLD presenting with back pain. Was diagnosed with UTI in ED day prior to admission, sent home on cefpodoxime but developed worsening back pain, rigors so returned to ED. Monitored in CDU and underwent CT a/p which showed pyelonephritis and mild R hydronephrosis. At present, pt notes significant improvement in back pain, no further fevers/chills. No N/V/D. No cp, palpitations, no sob/cough. ROS otherwise negative.  (25 Aug 2024 16:28)    Hospital Course:  ID consulted. Ceftriaxone 1 gm Q24h started with IVF's     Important Medication Changes and Reason: antibiotics    Active or Pending Issues Requiring Follow-up: PCP and     Advanced Directives:   [ x] Full code  [ ] DNR  [ ] Hospice    Discharge Diagnoses:  # Complicated UTI, right pyelonephritis  # Leukocytosis  #fever     HPI:  64m hx recurrent UTI's, prostate ca s/p prostatectomy, HLD presenting with back pain. Was diagnosed with UTI in ED day prior to admission, sent home on cefpodoxime but developed worsening back pain, rigors so returned to ED. Monitored in CDU and underwent CT a/p which showed pyelonephritis and mild R hydronephrosis. At present, pt notes significant improvement in back pain, no further fevers/chills. No N/V/D. No cp, palpitations, no sob/cough. ROS otherwise negative.  (25 Aug 2024 16:28)    Hospital Course:  ID consulted. Ceftriaxone 1 gm Q24h started with IVF's   - Can change to Cipro 500 mg po bid to complete 10 days total of abx - end date 9/2/24  - blood cultures NGTD at 48 hrs.    Important Medication Changes and Reason: antibiotics  Active or Pending Issues Requiring Follow-up: PCP and     Advanced Directives:   [ x] Full code  [ ] DNR  [ ] Hospice    Discharge Diagnoses:  # Complicated UTI, right pyelonephritis  # Leukocytosis  #fever

## 2024-08-27 NOTE — PROGRESS NOTE ADULT - SUBJECTIVE AND OBJECTIVE BOX
Subjective  Denies chills, nausea, vomiting, SOB, CP. Tolerating diet. Patient febrile to 101.3 in last 24 hours. Patient is otherwise asymptomatic and states that dysuria has resolved. Patients Ucx positive for E. coli with sensitives pending, repeat UCx negative.     Objective    Vital signs  T(F): , Max: 103.1 (08-25-24 @ 11:44)  HR: 76 (08-26-24 @ 04:47)  BP: 132/79 (08-26-24 @ 04:47)  SpO2: 95% (08-26-24 @ 04:47)  Wt(kg): --    Output     OUT:    Post-Void Residual per Intermittent Catheterization (mL): 0 mL    Voided (mL): 700 mL  Total OUT: 700 mL    Total NET: -700 mL          Gen: NAD  Abd: soft, nontender, nondistended  : No b/l CVAT, IPP in place with no evidence of infection (no erythema, no tenderness).     Labs      08-25 @ 06:11    WBC 9.73  / Hct 42.8  / SCr 1.08     08-24 @ 17:41    WBC 10.08 / Hct 43.8  / SCr 1.20         Culture - Urine (collected 08-24-24 @ 17:41)  Source: Clean Catch Clean Catch (Midstream)  Final Report (08-25-24 @ 16:51):    No growth    Culture - Blood (collected 08-24-24 @ 17:32)  Source: .Blood Blood-Peripheral  Preliminary Report (08-25-24 @ 22:02):    No growth at 24 hours    Culture - Blood (collected 08-24-24 @ 17:10)  Source: .Blood Blood-Peripheral  Preliminary Report (08-25-24 @ 22:02):    No growth at 24 hours    Culture - Urine (collected 08-23-24 @ 19:20)  Source: Clean Catch Clean Catch (Midstream)  Preliminary Report (08-25-24 @ 06:25):    >100,000 CFU/ml Escherichia coli        Urine Cx: e. coli  Blood Cx: ?    Imaging
64yPatient is a 64y old  Male who presents with a chief complaint of back pain (26 Aug 2024 13:11)      Interval history:    Feels much better.  No more fevers.  Ucxr with E coli      Antimicrobials:    cefTRIAXone   IVPB 1000 milliGRAM(s) IV Intermittent every 24 hours       Vital Signs Last 24 Hrs  T(F): 97.8 (08-27-24 @ 13:00), Max: 98.9 (08-26-24 @ 21:27)  HR: 80 (08-27-24 @ 13:00)  BP: 120/71 (08-27-24 @ 13:00)  RR: 18 (08-27-24 @ 13:00)  SpO2: 95% (08-27-24 @ 13:00) (95% - 96%)  Wt(kg): --      PHYSICAL EXAM:    General: Patient in NAD  HEENT: NCAT, EOMI, PERRL, no oral lesions  CV: S1+S2, no m/r/g appreciated   Lungs: No respiratory distress, CTAB  Abd: Soft, nontender, no guarding, no rebound tenderness, + bowel sounds   : suprapubic tenderness +, rt flank tenderness+ resolved  Neuro: Alert and oriented to time, place and person. Moves all extremities against gravity.  Ext: No cyanosis, no edema  Skin: No rash, no phlebitis                          13.4   7.97  )-----------( 205      ( 27 Aug 2024 07:24 )             39.3 08-27    140  |  104  |  16  ----------------------------<  85  4.0   |  21  |  0.99  Ca    9.1      27 Aug 2024 07:24  TPro  6.3  /  Alb  3.1  /  TBili  0.3  /  DBili  x   /  AST  43  /  ALT  46  /  AlkPhos  81  08-26                        13.3   9.67  )-----------( 176      ( 26 Aug 2024 07:18 )             38.4   08-26    139  |  104  |  14  ----------------------------<  90  4.0   |  22  |  1.04    Ca    8.8      26 Aug 2024 07:16    TPro  6.3  /  Alb  3.1<L>  /  TBili  0.3  /  DBili  x   /  AST  43<H>  /  ALT  46<H>  /  AlkPhos  81  08-26      LIVER FUNCTIONS - ( 26 Aug 2024 07:16 )  Alb: 3.1 g/dL / Pro: 6.3 g/dL / ALK PHOS: 81 U/L / ALT: 46 U/L / AST: 43 U/L / GGT: x             RECENT CULTURES:    Culture - Urine (collected 24 Aug 2024 17:41)  Source: Clean Catch Clean Catch (Midstream)  Final Report (25 Aug 2024 16:51):    No growth    Culture - Blood (collected 24 Aug 2024 17:32)  Source: .Blood Blood-Peripheral  Preliminary Report (26 Aug 2024 22:02):    No growth at 48 Hours    Culture - Blood (collected 24 Aug 2024 17:10)  Source: .Blood Blood-Peripheral  Preliminary Report (26 Aug 2024 22:02):    No growth at 48 Hours        Culture - Urine (collected 23 Aug 2024 19:20)  Source: Clean Catch Clean Catch (Midstream)  Preliminary Report (26 Aug 2024 11:37):    >100,000 CFU/ml Escherichia coli     Culture - Urine (08.23.24 @ 19:20)   - Amoxicillin/Clavulanic Acid: I 16/8  - Ampicillin: R >16 These ampicillin results predict results for amoxicillin  - Ampicillin/Sulbactam: R >16/8  - Aztreonam: S <=4  - Cefazolin: S 8 For uncomplicated UTI with K. pneumoniae, E. coli, or P. mirablis: HEMALATHA <=16 is sensitive and HEMALATHA >=32 is resistant. This also predicts results for oral agents cefaclor, cefdinir, cefpodoxime, cefprozil, cefuroxime axetil, cephalexin and locarbef for uncomplicated UTI. Note that some isolates may be susceptible to these agents while testing resistant to cefazolin.  - Cefepime: S <=2  - Cefoxitin: S <=8  - Ceftriaxone: S <=1  - Cefuroxime: S <=4  - Ciprofloxacin: S <=0.25  - Ertapenem: S <=0.5  - Gentamicin: S <=2  - Imipenem: S <=1  - Levofloxacin: S <=0.5  - Meropenem: S <=1  - Nitrofurantoin: S <=32 Should not be used to treat pyelonephritis  - Piperacillin/Tazobactam: S <=8  - Tobramycin: S <=2  - Trimethoprim/Sulfamethoxazole: S <=0.5/9.5  Specimen Source: Clean Catch Clean Catch (Midstream)  Culture Results:   >100,000 CFU/ml Escherichia coli  Organism Identification: Escherichia coli      Radiology:  < from: CT Abdomen and Pelvis w/ IV Cont (08.25.24 @ 12:54) >  IMPRESSION:  1.  Patchy cortical hypodensities throughout the right kidney, consistent   with acute right pyelonephritis. No renal abscess.    2.  Mild right hydroureteronephrosis to the level of the bladder, where   the right ureter is reimplanted anteriorly. No obstructive stone.    < end of copied text >  
64yPatient is a 64y old  Male who presents with a chief complaint of back pain (26 Aug 2024 13:11)      Interval history:    Feels much better. Still with some fevers    Antimicrobials:    cefTRIAXone   IVPB 1000 milliGRAM(s) IV Intermittent every 24 hours      Vital Signs Last 24 Hrs  T(C): 37.1 (08-26-24 @ 12:08), Max: 38.5 (08-25-24 @ 16:28)  T(F): 98.7 (08-26-24 @ 12:08), Max: 101.3 (08-25-24 @ 16:28)  HR: 74 (08-26-24 @ 12:08) (67 - 90)  BP: 139/83 (08-26-24 @ 12:08) (122/77 - 168/100)  BP(mean): 96 (08-25-24 @ 16:28) (96 - 96)  RR: 18 (08-26-24 @ 12:08) (18 - 18)  SpO2: 95% (08-26-24 @ 12:08) (94% - 98%)      PHYSICAL EXAM:    General: Patient in NAD  HEENT: NCAT, EOMI, PERRL, no oral lesions  CV: S1+S2, no m/r/g appreciated   Lungs: No respiratory distress, CTAB  Abd: Soft, nontender, no guarding, no rebound tenderness, + bowel sounds   : suprapubic tenderness +, rt flank tenderness+ resolved  Neuro: Alert and oriented to time, place and person. Moves all extremities against gravity.  Ext: No cyanosis, no edema  Skin: No rash, no phlebitis                          13.3   9.67  )-----------( 176      ( 26 Aug 2024 07:18 )             38.4   08-26    139  |  104  |  14  ----------------------------<  90  4.0   |  22  |  1.04    Ca    8.8      26 Aug 2024 07:16    TPro  6.3  /  Alb  3.1<L>  /  TBili  0.3  /  DBili  x   /  AST  43<H>  /  ALT  46<H>  /  AlkPhos  81  08-26      LIVER FUNCTIONS - ( 26 Aug 2024 07:16 )  Alb: 3.1 g/dL / Pro: 6.3 g/dL / ALK PHOS: 81 U/L / ALT: 46 U/L / AST: 43 U/L / GGT: x             RECENT CULTURES:    Culture - Urine (collected 24 Aug 2024 17:41)  Source: Clean Catch Clean Catch (Midstream)  Final Report (25 Aug 2024 16:51):    No growth    Culture - Blood (collected 24 Aug 2024 17:32)  Source: .Blood Blood-Peripheral  Preliminary Report (25 Aug 2024 22:02):    No growth at 24 hours    Culture - Blood (collected 24 Aug 2024 17:10)  Source: .Blood Blood-Peripheral  Preliminary Report (25 Aug 2024 22:02):    No growth at 24 hours    Culture - Urine (collected 23 Aug 2024 19:20)  Source: Clean Catch Clean Catch (Midstream)  Preliminary Report (26 Aug 2024 11:37):    >100,000 CFU/ml Escherichia coli Susceptibility to follow.        Radiology:  < from: CT Abdomen and Pelvis w/ IV Cont (08.25.24 @ 12:54) >  IMPRESSION:  1.  Patchy cortical hypodensities throughout the right kidney, consistent   with acute right pyelonephritis. No renal abscess.    2.  Mild right hydroureteronephrosis to the level of the bladder, where   the right ureter is reimplanted anteriorly. No obstructive stone.    < end of copied text >

## 2024-08-27 NOTE — PROGRESS NOTE ADULT - ASSESSMENT
62 M with hx of prostate CA 2008 with adjuvant radiation 2009, ED s/p inflatable penile prosthesis, presents for worsening back pain. Per pt, he has multiple UTI, however, no culture in our system. Seen in this ED 8/23 for dysuria. Dx with UTI. Given CTX at ED, d/c home with cefpodoxime. Reports episode of rigors and worsening back pain overnight. Return to ED for further work up. +fever, chills, back pain. nausea. No vomiting. no dysuria.    ED course: T max 102.9, WBC 16-->9    -->17 WBC, Ucx E coli      # Complicated UTI, right pyelonephritis  # Leukocytosis - resolved  #fever -resolved    - Can change to cipro 500 mg po bid to complete 10 days total of abx - end date 9/2/24  - blood cultures NGTD at 48 hrs.    d/w NP      Kaila Meléndez MD  [phone] (pager)  [phone] (office).
_________________________________________________________________________________________  ========>>  M E D I C A L   A T T E N D I N G    F O L L O W  U P  N O T E  <<=========  -----------------------------------------------------------------------------------------------------    - Patient seen and examined by me earlier today.   - In summary,  BARBARA COSTA is a 64y year old man admitted with Pyelonephritis  - Patient today overall doing ok, comfortable, eating OK.     patient overall feeling better ..eating well, no more pain in flank..     ==================>> REVIEW OF SYSTEM <<=================    GEN: no fever, no chills, no pain  RESP: no SOB, no cough, no sputum  CVS: no chest pain, no palpitations  GI: no abdominal pain, no nausea, no constipation, no diarrhea  : no dysuria, no frequency  Neuro: no headache, no dizziness    ==================>> PHYSICAL EXAM <<=================    GEN: A&O X 3 , NAD , comfortable, pleasant, calm   HEENT: NCAT, PERRL, MMM, hearing intact  CVS: S1S2 , regular , No M/R/G appreciated  PULM: CTA B/L,  no W/R/R appreciated  ABD.: soft. non tender, non distended,  bowel sounds present  Extrem: intact pulses , no edema           ( Note written / Date of service 08-26-24 ( This is certified to be the same as "ENTERED" date above ( for billing purposes)))    ==================>> MEDICATIONS <<====================    MEDICATIONS  (STANDING):  cefTRIAXone   IVPB 1000 milliGRAM(s) IV Intermittent every 24 hours  enoxaparin Injectable 40 milliGRAM(s) SubCutaneous every 24 hours  finasteride 5 milliGRAM(s) Oral daily  lactated ringers. 1000 milliLiter(s) (125 mL/Hr) IV Continuous <Continuous>  tamsulosin 0.4 milliGRAM(s) Oral at bedtime    MEDICATIONS  (PRN):  acetaminophen     Tablet .. 650 milliGRAM(s) Oral every 6 hours PRN Temp greater or equal to 38C (100.4F), Mild Pain (1 - 3)  aluminum hydroxide/magnesium hydroxide/simethicone Suspension 30 milliLiter(s) Oral every 4 hours PRN Dyspepsia  melatonin 3 milliGRAM(s) Oral at bedtime PRN Insomnia  ondansetron Injectable 4 milliGRAM(s) IV Push every 8 hours PRN Nausea and/or Vomiting    ___________  Active diet:  Diet, Regular  ___________________    ==================>> VITAL SIGNS <<==================    T(C): 37.1 (08-26-24 @ 12:08), Max: 38.5 (08-25-24 @ 16:28)  HR: 74 (08-26-24 @ 12:08) (67 - 90)  BP: 139/83 (08-26-24 @ 12:08) (122/77 - 168/100)  BP(mean): 96 (08-25-24 @ 16:28)  RR: 18 (08-26-24 @ 12:08) (18 - 18)  SpO2: 95% (08-26-24 @ 12:08) (94% - 98%)     I&O's Summary    25 Aug 2024 07:01  -  26 Aug 2024 07:00  --------------------------------------------------------  IN: 1400 mL / OUT: 700 mL / NET: 700 mL    26 Aug 2024 07:01  -  26 Aug 2024 13:12  --------------------------------------------------------  IN: 240 mL / OUT: 0 mL / NET: 240 mL      ==================>> LAB AND IMAGING <<==================                        13.3   9.67  )-----------( 176      ( 26 Aug 2024 07:18 )             38.4        08-26    139  |  104  |  14  ----------------------------<  90  4.0   |  22  |  1.04    Ca    8.8      26 Aug 2024 07:16    TPro  6.3  /  Alb  3.1<L>  /  TBili  0.3  /  DBili  x   /  AST  43<H>  /  ALT  46<H>  /  AlkPhos  81  08-26    PT/INR - ( 24 Aug 2024 17:41 )   PT: 11.5 sec;   INR: 1.10 ratio    PTT - ( 24 Aug 2024 17:41 )  PTT:31.4 sec               Urinalysis:  08-24-24 @ 17:41  Leuk. Est.: Trace  Nirite: Negative  WBC: 17  Blood: Non Hemolyzed Trace     salt Crystal: --     calcium crystal: --     Bili: Negative     cast: 1  color: Yellow  Bacteria: Negative  Epith. cell: 1  Yeast: --     Ketone: Trace     Protein: 100     Glucose: Negative     sperm: --     Spec.Gravity: 1.018    ____________________________    M I C R O B I O L O G Y :    Culture - Urine (collected 24 Aug 2024 17:41)  Source: Clean Catch Clean Catch (Midstream)  Final Report (25 Aug 2024 16:51):    No growth    Culture - Blood (collected 24 Aug 2024 17:32)  Source: .Blood Blood-Peripheral  Preliminary Report (25 Aug 2024 22:02):    No growth at 24 hours    Culture - Blood (collected 24 Aug 2024 17:10)  Source: .Blood Blood-Peripheral  Preliminary Report (25 Aug 2024 22:02):    No growth at 24 hours    Culture - Urine (collected 23 Aug 2024 19:20)  Source: Clean Catch Clean Catch (Midstream)  Preliminary Report (26 Aug 2024 11:37):    >100,000 CFU/ml Escherichia coli Susceptibility to follow.      < from: CT Abdomen and Pelvis w/ IV Cont (08.25.24 @ 12:54) >  IMPRESSION:  1.  Patchy cortical hypodensities throughout the right kidney, consistent   with acute right pyelonephritis. No renal abscess.  2.  Mild right hydroureteronephrosis to the level of the bladder, where   the right ureter is reimplanted anteriorly. No obstructive stone.  < end of copied text >    ___________________________________________________________________________________  ===============>>  A S S E S S M E N T   A N D   P L A N <<===============  ------------------------------------------------------------------------------------------    · Assessment	  64m hx HLD, prostate ca s/p prostatectomy presenting with recurrent UTI found to have pyelonephritis    Problem/Plan - 1:  ·  Problem: Sepsis.   ·  Plan: 2/2 pyelonephritis, found to have mild R hydro as well  - prelim ucx growing ecoli,, continue ctx 1gm iv q24 hours  - f/u final cultures/sensititivies  - urology appreciated     Problem/Plan - 2:  ·  Problem: Prostate CA.   ·  Plan: s/p prostatectomy  - cont medications     Problem/Plan - 3:  ·  Problem: HLD (hyperlipidemia).   ·  Plan: resume repatha/icosapent as OP    -GI/DVT Prophylaxis per protocol.    --------------------------------------------  Case discussed with  patient, family at bedside, Nurse Practitioner   Education given on findings and plan of care  ___________________________  H. KHURRAM Mcdowell.  Pager: [phone]    
62 M with hx of prostate CA 2008 with adjuvant radiation 2009, ED s/p inflatable penile prosthesis, presents for worsening back pain. Per pt, he has multiple UTI, however, no culture in our system. Seen in this ED 8/23 for dysuria. Dx with UTI. Given CTX at ED, d/c home with cefpodoxime. Reports episode of rigors and worsening back pain overnight. Return to ED for further work up. +fever, chills, back pain. nausea. No vomiting. no dysuria.    ED course: T max 102.9, WBC 16-->9    -->17 WBC, Ucx E coli      # Complicated UTI, right pyelonephritis  # Leukocytosis  #fever    - Continue e to 1 gm Q24h  - follow up blood and final Ucx - in lab  - trend fever curve and wbc  - hope to transition to po antibiotic once afebrile and cultures are final    ID will follow    Kaila Meléndez MD  [phone] (pager)  [phone] (office).
                                            M E D I C A L   A T T E N D I N G    F O L L O W    U P   N O T E  (08-27-24 )                                     ------------------------------------------------------------------------------------------------    patient evaluated by me, case discussed with team, chart, medications, and physical exam reviewed, labs / tests  and vitals reviewed by me, as bellow.   Patient is stable for discharge today. patient overall feeling well, wants to go home.. finalizing antibiotics regimen, duration.. per ID.. for DC planing home today .. discussed with patient and wife, all questions answered.   Patient to follow up with  ANTONIETA GARCIA ( already has appointment)   See discharge document for full note (discussed with ACP).  [Greater than 35 min spent for these services. ]          ( Note written / Date of service 08-27-24 ( This is certified to be the same as "ENTERED" date above ( for billing purposes)))    ==================>> MEDICATIONS <<====================    cefTRIAXone   IVPB 1000 milliGRAM(s) IV Intermittent every 24 hours  enoxaparin Injectable 40 milliGRAM(s) SubCutaneous every 24 hours  finasteride 5 milliGRAM(s) Oral daily  lactated ringers. 1000 milliLiter(s) IV Continuous <Continuous>  tamsulosin 0.4 milliGRAM(s) Oral at bedtime    MEDICATIONS  (PRN):  acetaminophen     Tablet .. 650 milliGRAM(s) Oral every 6 hours PRN Temp greater or equal to 38C (100.4F), Mild Pain (1 - 3)  aluminum hydroxide/magnesium hydroxide/simethicone Suspension 30 milliLiter(s) Oral every 4 hours PRN Dyspepsia  melatonin 3 milliGRAM(s) Oral at bedtime PRN Insomnia  ondansetron Injectable 4 milliGRAM(s) IV Push every 8 hours PRN Nausea and/or Vomiting    ___________  Active diet:  Diet, Regular  ___________________    ==================>> VITAL SIGNS <<==================    T(C): 36.6 (08-27-24 @ 13:00), Max: 37.2 (08-26-24 @ 21:27)  HR: 80 (08-27-24 @ 13:00) (68 - 80)  BP: 120/71 (08-27-24 @ 13:00) (120/71 - 145/89)  RR: 18 (08-27-24 @ 13:00) (18 - 18)  SpO2: 95% (08-27-24 @ 13:00) (95% - 96%)     I&O's Summary    26 Aug 2024 07:01  -  27 Aug 2024 07:00  --------------------------------------------------------  IN: 2610 mL / OUT: 0 mL / NET: 2610 mL       ==================>> LAB AND IMAGING <<==================                        13.4   7.97  )-----------( 205      ( 27 Aug 2024 07:24 )             39.3        08-27    140  |  104  |  16  ----------------------------<  85  4.0   |  21<L>  |  0.99    Ca    9.1      27 Aug 2024 07:24    TPro  6.3  /  Alb  3.1<L>  /  TBili  0.3  /  DBili  x   /  AST  43<H>  /  ALT  46<H>  /  AlkPhos  81  08-26       Urinalysis:  08-24-24 @ 17:41  Leuk. Est.: Trace  Nirite: Negative  WBC: 17  Blood: Non Hemolyzed Trace     salt Crystal: --     calcium crystal: --     Bili: Negative     cast: 1  color: Yellow  Bacteria: Negative  Epith. cell: 1  Yeast: --     Ketone: Trace     Protein: 100     Glucose: Negative     sperm: --     Spec.Gravity: 1.018    WBC count:   7.97 <<== ,  9.67 <<== ,  9.73 <<== ,  10.08 <<== ,  16.09 <<==   Hemoglobin:   13.4 <<==,  13.3 <<==,  14.2 <<==,  15.0 <<==,  14.6 <<==  platelets:  205 <==, 176 <==, 143 <==, 159 <==, 175 <==    Creatinine:  0.99  <<==, 1.04  <<==, 1.08  <<==, 1.20  <<==, 1.13  <<==  Sodium:   140  <==, 139  <==, 138  <==, 138  <==, 135  <==       AST:          43(08-26) <== , 24(08-25) <== , 34(08-24) <== , 19(08-23) <==      ALT:        46(08-26)  <== , 32(08-25)  <== , 40(08-24)  <== , 19(08-23)  <==      AP:        81(08-26)  <=, 56(08-25)  <=, 63(08-24)  <=, 57(08-23)  <=     Bili:        0.3(08-26)  <=, 0.3(08-25)  <=, 0.4(08-24)  <=, 0.6(08-23)  <=     ____________________________    M I C R O B I O L O G Y :    Culture - Urine (collected 24 Aug 2024 17:41)  Source: Clean Catch Clean Catch (Midstream)  Final Report (25 Aug 2024 16:51):    No growth    Culture - Blood (collected 24 Aug 2024 17:32)  Source: .Blood Blood-Peripheral  Preliminary Report (26 Aug 2024 22:02):    No growth at 48 Hours    Culture - Blood (collected 24 Aug 2024 17:10)  Source: .Blood Blood-Peripheral  Preliminary Report (26 Aug 2024 22:02):    No growth at 48 Hours    Culture - Urine (collected 23 Aug 2024 19:20)  Source: Clean Catch Clean Catch (Midstream)  Final Report (27 Aug 2024 11:56):    >100,000 CFU/ml Escherichia coli  Organism: Escherichia coli (27 Aug 2024 11:56)  Organism: Escherichia coli (27 Aug 2024 11:56)    Sensitivities:      -  Levofloxacin: S <=0.5      -  Tobramycin: S <=2      -  Nitrofurantoin: S <=32 Should not be used to treat pyelonephritis      -  Aztreonam: S <=4      -  Gentamicin: S <=2      -  Cefazolin: S 8 For uncomplicated UTI with K. pneumoniae, E. coli, or P. mirablis: HEMALATHA <=16 is sensitive and HEMALATHA >=32 is resistant. This also predicts results for oral agents cefaclor, cefdinir, cefpodoxime, cefprozil, cefuroxime axetil, cephalexin and locarbef for uncomplicated UTI. Note that some isolates may be susceptible to these agents while testing resistant to cefazolin.      -  Cefepime: S <=2      -  Piperacillin/Tazobactam: S <=8      -  Ciprofloxacin: S <=0.25      -  Imipenem: S <=1      -  Ceftriaxone: S <=1      -  Ampicillin: R >16 These ampicillin results predict results for amoxicillin      Method Type: HEMALATHA      -  Meropenem: S <=1      -  Ampicillin/Sulbactam: R >16/8      -  Cefoxitin: S <=8      -  Cefuroxime: S <=4      -  Amoxicillin/Clavulanic Acid: I 16/8      -  Trimethoprim/Sulfamethoxazole: S <=0.5/9.5      -  Ertapenem: S <=0.5         ( Note written / Date of service 08-27-24 ( This is certified to be the same as "ENTERED" date above ( for billing Purposes )))  
63 y/o M with hx of prostate CA s/p prostatectomy with R ureteral reimplant with Dr. Sewell 2008 and RT, ED s/p inflatable penile prosthesis with Dr. Simon worsening R flank pain associated with dysuria, increased frequency and fevers with concern for R pyelonephritis. Febrile in ED to 103.1, other VSS. WBC 9.7, Cr 1.08, UCx with E coli. CT shows R pyelonephritis and mild R hydronephrosis to level of the bladder.    Patient likely has R pyelonephritis as suggested on imaging, likely predisposed from lack of anti-reflux mechanism in re-implanted R ureter. No concern for infected penile prosthesis. No urologic intervention indicated at this time    Recs:  - broad spectrum antibiotics -> patient currently on CTX, patient can be transitioned to PO abx once sensitives have returned can finalize duration with ID who is following  - follow up urine and blood culture -> Ucx + for E. coli with sens pending and Bcx NGTD  - check post void residual by bladder scan -> PVR still pending  - begin Flomax  - trend fever curve  - needs recurrent UTI workup outpatient with Dr. Simon or Dr. Sewell  - Discussed with Dr. Trung [facility]  [address] [place] [address]  [phone]    No further recommendations from urologic perspective, urology to sign off please reconsult as needed. For reconsults page [phone].

## 2024-08-27 NOTE — DISCHARGE NOTE PROVIDER - NSDCCPCAREPLAN_GEN_ALL_CORE_FT
PRINCIPAL DISCHARGE DIAGNOSIS  Diagnosis: Pyelonephritis  Assessment and Plan of Treatment: Complete antibiotics and follow up with PCP and  within 1 week.  Return if worsens.      SECONDARY DISCHARGE DIAGNOSES  Diagnosis: Sepsis  Assessment and Plan of Treatment: Improved. Plan as above    Diagnosis: Prostate CA  Assessment and Plan of Treatment: follow uop with urologist

## 2024-08-29 LAB
CULTURE RESULTS: SIGNIFICANT CHANGE UP
CULTURE RESULTS: SIGNIFICANT CHANGE UP
SPECIMEN SOURCE: SIGNIFICANT CHANGE UP
SPECIMEN SOURCE: SIGNIFICANT CHANGE UP

## 2024-09-03 PROBLEM — C61 MALIGNANT NEOPLASM OF PROSTATE: Chronic | Status: ACTIVE | Noted: 2024-08-23

## 2024-09-09 ENCOUNTER — APPOINTMENT (OUTPATIENT)
Dept: UROLOGY | Facility: CLINIC | Age: 64
End: 2024-09-09

## 2024-09-11 ENCOUNTER — APPOINTMENT (OUTPATIENT)
Dept: UROLOGY | Facility: CLINIC | Age: 64
End: 2024-09-11
Payer: COMMERCIAL

## 2024-09-11 VITALS
SYSTOLIC BLOOD PRESSURE: 110 MMHG | DIASTOLIC BLOOD PRESSURE: 80 MMHG | BODY MASS INDEX: 28 KG/M2 | TEMPERATURE: 97.1 F | WEIGHT: 200 LBS | HEIGHT: 71 IN

## 2024-09-11 DIAGNOSIS — Z90.79 ACQUIRED ABSENCE OF OTHER GENITAL ORGAN(S): ICD-10-CM

## 2024-09-11 DIAGNOSIS — Z00.00 ENCOUNTER FOR GENERAL ADULT MEDICAL EXAMINATION W/OUT ABNORMAL FINDINGS: ICD-10-CM

## 2024-09-11 DIAGNOSIS — N39.3 STRESS INCONTINENCE (FEMALE) (MALE): ICD-10-CM

## 2024-09-11 DIAGNOSIS — N39.0 URINARY TRACT INFECTION, SITE NOT SPECIFIED: ICD-10-CM

## 2024-09-11 DIAGNOSIS — Z96.0 PRESENCE OF UROGENITAL IMPLANTS: ICD-10-CM

## 2024-09-11 DIAGNOSIS — R35.0 FREQUENCY OF MICTURITION: ICD-10-CM

## 2024-09-11 DIAGNOSIS — T66.XXXA RADIATION SICKNESS, UNSPECIFIED, INITIAL ENCOUNTER: ICD-10-CM

## 2024-09-11 DIAGNOSIS — Z85.46 PERSONAL HISTORY OF MALIGNANT NEOPLASM OF PROSTATE: ICD-10-CM

## 2024-09-11 DIAGNOSIS — N36.42 INTRINSIC SPHINCTER DEFICIENCY (ISD): ICD-10-CM

## 2024-09-11 PROCEDURE — 99214 OFFICE O/P EST MOD 30 MIN: CPT | Mod: 25

## 2024-09-11 PROCEDURE — 51798 US URINE CAPACITY MEASURE: CPT

## 2024-09-11 NOTE — ASSESSMENT
[FreeTextEntry1] : BLADDER SCAN: Indication: Increased frequency of urination. Initial Volume:    cc PVR: 0  cc Results: Urinary retention Recommendations: No Therapy Needed.

## 2024-09-11 NOTE — PLAN
[TextEntry] : A/P, 64M for annual f/u - UA/Cxr today - cardiac clearance for AUS - schedule AUS - f/u 1 wk

## 2024-09-11 NOTE — END OF VISIT
[FreeTextEntry3] :  The complete time calculation (45 minutes) for this patient encounter, includes a review of the patient's past medical records pertinent to his chief complaint, including medical, and surgical history, review of medications taken and of previous visits with other health care providers. In addition to the time spent with the patient, the total time calculation also includes the time necessary to document all of the formation gathered during this session into the patient's electronic health records. [Time Spent: ___ minutes] : I have spent [unfilled] minutes of time on the encounter which excludes teaching and separately reported services.

## 2024-09-11 NOTE — PHYSICAL EXAM
[General Appearance - Well Developed] : well developed [General Appearance - Well Nourished] : well nourished [Heart Rate And Rhythm] : heart rate and rhythm were normal [] : no respiratory distress [Respiration, Rhythm And Depth] : normal respiratory rhythm and effort [Bowel Sounds] : normal bowel sounds [Abdomen Soft] : soft [Chaperone Present] : A chaperone was present in the examining room during all aspects of the physical examination [FreeTextEntry2] : Ralf Bui NP [TextEntry] : The patient is status post insertion of inflatable multicomponent inflatable penile prosthesis in the past. The overall appearance is excellent there is no edema, swelling or erythema. The incision is well-healed the edges are well approximated there is no discharge. Cylinders sizing is excellent.  The distal tips are well positioned into the mid glans. The appearance of the cylinders deflated and the shaft of the penis flaccid is also good. Scrotal skin is intact the location of the pump is excellent it is in in in the back of the scrotum, well concealed, yet accessible to manipulation. There is no tethering of the pump. The reservoir in the lower quadrant of the abdomen is nonpalpable.

## 2024-09-12 LAB
APPEARANCE: CLEAR
BILIRUBIN URINE: NEGATIVE
BLOOD URINE: NEGATIVE
COLOR: YELLOW
GLUCOSE QUALITATIVE U: NEGATIVE MG/DL
KETONES URINE: NEGATIVE MG/DL
LEUKOCYTE ESTERASE URINE: NEGATIVE
NITRITE URINE: NEGATIVE
PH URINE: 7
PROTEIN URINE: NEGATIVE MG/DL
SPECIFIC GRAVITY URINE: 1.01
UROBILINOGEN URINE: 0.2 MG/DL

## 2024-09-30 ENCOUNTER — APPOINTMENT (OUTPATIENT)
Dept: UROLOGY | Facility: CLINIC | Age: 64
End: 2024-09-30

## 2024-11-18 ENCOUNTER — APPOINTMENT (OUTPATIENT)
Dept: UROLOGY | Facility: CLINIC | Age: 64
End: 2024-11-18

## 2024-12-16 ENCOUNTER — NON-APPOINTMENT (OUTPATIENT)
Age: 64
End: 2024-12-16

## 2024-12-16 ENCOUNTER — APPOINTMENT (OUTPATIENT)
Dept: UROLOGY | Facility: CLINIC | Age: 64
End: 2024-12-16
Payer: COMMERCIAL

## 2024-12-16 VITALS
DIASTOLIC BLOOD PRESSURE: 80 MMHG | HEIGHT: 71 IN | WEIGHT: 200 LBS | SYSTOLIC BLOOD PRESSURE: 122 MMHG | TEMPERATURE: 97.3 F | BODY MASS INDEX: 28 KG/M2

## 2024-12-16 DIAGNOSIS — N36.42 INTRINSIC SPHINCTER DEFICIENCY (ISD): ICD-10-CM

## 2024-12-16 DIAGNOSIS — Z96.0 PRESENCE OF UROGENITAL IMPLANTS: ICD-10-CM

## 2024-12-16 DIAGNOSIS — R33.9 RETENTION OF URINE, UNSPECIFIED: ICD-10-CM

## 2024-12-16 DIAGNOSIS — Z90.79 ACQUIRED ABSENCE OF OTHER GENITAL ORGAN(S): ICD-10-CM

## 2024-12-16 DIAGNOSIS — Z00.00 ENCOUNTER FOR GENERAL ADULT MEDICAL EXAMINATION W/OUT ABNORMAL FINDINGS: ICD-10-CM

## 2024-12-16 DIAGNOSIS — N39.3 STRESS INCONTINENCE (FEMALE) (MALE): ICD-10-CM

## 2024-12-16 DIAGNOSIS — R35.0 FREQUENCY OF MICTURITION: ICD-10-CM

## 2024-12-16 PROCEDURE — 51741 ELECTRO-UROFLOWMETRY FIRST: CPT

## 2024-12-16 PROCEDURE — 99214 OFFICE O/P EST MOD 30 MIN: CPT | Mod: 25

## 2024-12-16 PROCEDURE — 51798 US URINE CAPACITY MEASURE: CPT

## 2024-12-28 ENCOUNTER — NON-APPOINTMENT (OUTPATIENT)
Age: 64
End: 2024-12-28

## 2024-12-30 ENCOUNTER — NON-APPOINTMENT (OUTPATIENT)
Age: 64
End: 2024-12-30

## 2025-01-29 ENCOUNTER — NON-APPOINTMENT (OUTPATIENT)
Age: 65
End: 2025-01-29

## 2025-01-29 ENCOUNTER — APPOINTMENT (OUTPATIENT)
Dept: UROLOGY | Facility: CLINIC | Age: 65
End: 2025-01-29
Payer: COMMERCIAL

## 2025-01-29 VITALS
BODY MASS INDEX: 29.4 KG/M2 | DIASTOLIC BLOOD PRESSURE: 82 MMHG | HEIGHT: 71 IN | TEMPERATURE: 97.8 F | SYSTOLIC BLOOD PRESSURE: 124 MMHG | WEIGHT: 210 LBS

## 2025-01-29 DIAGNOSIS — R35.0 FREQUENCY OF MICTURITION: ICD-10-CM

## 2025-01-29 DIAGNOSIS — Z96.0 PRESENCE OF UROGENITAL IMPLANTS: ICD-10-CM

## 2025-01-29 DIAGNOSIS — Z85.46 PERSONAL HISTORY OF MALIGNANT NEOPLASM OF PROSTATE: ICD-10-CM

## 2025-01-29 DIAGNOSIS — Z01.818 ENCOUNTER FOR OTHER PREPROCEDURAL EXAMINATION: ICD-10-CM

## 2025-01-29 DIAGNOSIS — Z90.79 ACQUIRED ABSENCE OF OTHER GENITAL ORGAN(S): ICD-10-CM

## 2025-01-29 DIAGNOSIS — N39.3 STRESS INCONTINENCE (FEMALE) (MALE): ICD-10-CM

## 2025-01-29 DIAGNOSIS — N36.42 INTRINSIC SPHINCTER DEFICIENCY (ISD): ICD-10-CM

## 2025-01-29 DIAGNOSIS — N39.0 URINARY TRACT INFECTION, SITE NOT SPECIFIED: ICD-10-CM

## 2025-01-29 DIAGNOSIS — R33.9 RETENTION OF URINE, UNSPECIFIED: ICD-10-CM

## 2025-01-29 DIAGNOSIS — N30.40 IRRADIATION CYSTITIS W/OUT HEMATURIA: ICD-10-CM

## 2025-01-29 DIAGNOSIS — Z00.00 ENCOUNTER FOR GENERAL ADULT MEDICAL EXAMINATION W/OUT ABNORMAL FINDINGS: ICD-10-CM

## 2025-01-29 PROCEDURE — 99215 OFFICE O/P EST HI 40 MIN: CPT | Mod: 25

## 2025-01-29 PROCEDURE — 52000 CYSTOURETHROSCOPY: CPT

## 2025-01-29 PROCEDURE — 51725 SIMPLE CYSTOMETROGRAM: CPT

## 2025-01-29 PROCEDURE — 51741 ELECTRO-UROFLOWMETRY FIRST: CPT

## 2025-01-29 PROCEDURE — 51798 US URINE CAPACITY MEASURE: CPT

## 2025-01-29 RX ORDER — IBUPROFEN 600 MG/1
600 TABLET, FILM COATED ORAL
Qty: 28 | Refills: 0 | Status: ACTIVE | COMMUNITY
Start: 2025-01-29 | End: 1900-01-01

## 2025-01-29 RX ORDER — CEPHALEXIN 250 MG/1
250 CAPSULE ORAL
Qty: 84 | Refills: 0 | Status: ACTIVE | COMMUNITY
Start: 2025-01-29 | End: 1900-01-01

## 2025-01-29 RX ORDER — MAGNESIUM HYDROXIDE 400 MG/5ML
7.75 SUSPENSION, ORAL (FINAL DOSE FORM) ORAL
Qty: 355 | Refills: 0 | Status: ACTIVE | COMMUNITY
Start: 2025-01-29 | End: 1900-01-01

## 2025-01-29 RX ORDER — CHLORHEXIDINE GLUCONATE 213 G/1000ML
4 SOLUTION TOPICAL
Qty: 1 | Refills: 0 | Status: ACTIVE | COMMUNITY
Start: 2025-01-29 | End: 1900-01-01

## 2025-01-29 RX ORDER — NITROFURANTOIN MACROCRYSTAL 100 MG
CAPSULE ORAL
Refills: 0 | Status: ACTIVE | COMMUNITY

## 2025-01-29 RX ORDER — OXYCODONE AND ACETAMINOPHEN 5; 325 MG/1; MG/1
5-325 TABLET ORAL
Qty: 20 | Refills: 0 | Status: ACTIVE | COMMUNITY
Start: 2025-01-29 | End: 1900-01-01

## 2025-01-29 RX ORDER — DOCUSATE SODIUM 100 MG/1
100 CAPSULE ORAL TWICE DAILY
Qty: 28 | Refills: 0 | Status: ACTIVE | COMMUNITY
Start: 2025-01-29 | End: 1900-01-01

## 2025-02-11 RX ORDER — SODIUM CHLORIDE 9 G/ML
1000 INJECTION, SOLUTION INTRAVENOUS
Refills: 0 | Status: DISCONTINUED | OUTPATIENT
Start: 2025-02-14 | End: 2025-02-14

## 2025-02-13 RX ORDER — GENTAMICIN SULFATE 20 MG/2 ML
320 VIAL (ML) INJECTION ONCE
Refills: 0 | Status: DISCONTINUED | OUTPATIENT
Start: 2025-02-14 | End: 2025-02-14

## 2025-02-13 NOTE — ASU PATIENT PROFILE, ADULT - NSICDXPASTSURGICALHX_GEN_ALL_CORE_FT
PAST SURGICAL HISTORY:  S/P prostatectomy      PAST SURGICAL HISTORY:  History of implantation of penile prosthesis     S/P prostatectomy

## 2025-02-14 ENCOUNTER — OUTPATIENT (OUTPATIENT)
Dept: OUTPATIENT SERVICES | Facility: HOSPITAL | Age: 65
LOS: 1 days | Discharge: HOME | End: 2025-02-14

## 2025-02-14 ENCOUNTER — APPOINTMENT (OUTPATIENT)
Dept: UROLOGY | Facility: AMBULATORY SURGERY CENTER | Age: 65
End: 2025-02-14

## 2025-02-14 ENCOUNTER — TRANSCRIPTION ENCOUNTER (OUTPATIENT)
Age: 65
End: 2025-02-14

## 2025-02-14 VITALS
RESPIRATION RATE: 16 BRPM | OXYGEN SATURATION: 96 % | DIASTOLIC BLOOD PRESSURE: 79 MMHG | TEMPERATURE: 98 F | SYSTOLIC BLOOD PRESSURE: 123 MMHG | HEIGHT: 71 IN | WEIGHT: 211.64 LBS | HEART RATE: 72 BPM

## 2025-02-14 VITALS
TEMPERATURE: 98 F | DIASTOLIC BLOOD PRESSURE: 65 MMHG | OXYGEN SATURATION: 97 % | SYSTOLIC BLOOD PRESSURE: 108 MMHG | HEART RATE: 80 BPM | RESPIRATION RATE: 14 BRPM

## 2025-02-14 DIAGNOSIS — Z96.89 PRESENCE OF OTHER SPECIFIED FUNCTIONAL IMPLANTS: Chronic | ICD-10-CM

## 2025-02-14 DIAGNOSIS — Z90.79 ACQUIRED ABSENCE OF OTHER GENITAL ORGAN(S): Chronic | ICD-10-CM

## 2025-02-14 PROCEDURE — 53445 INSERT URO/VES NCK SPHINCTER: CPT | Mod: GC,22

## 2025-02-14 DEVICE — PUMP CONTROL: Type: IMPLANTABLE DEVICE | Status: FUNCTIONAL

## 2025-02-14 DEVICE — S.T. CUFF: Type: IMPLANTABLE DEVICE | Status: FUNCTIONAL

## 2025-02-14 DEVICE — PROSTH SPHIN URNY 61-70CM H2O: Type: IMPLANTABLE DEVICE | Status: FUNCTIONAL

## 2025-02-14 DEVICE — KIT ACCY URINARY CONTROL AMS 800 W/INHIBIZONE: Type: IMPLANTABLE DEVICE | Status: FUNCTIONAL

## 2025-02-14 RX ORDER — FENTANYL CITRATE 50 UG/ML
25 INJECTION INTRAMUSCULAR; INTRAVENOUS
Refills: 0 | Status: DISCONTINUED | OUTPATIENT
Start: 2025-02-14 | End: 2025-02-14

## 2025-02-14 RX ORDER — ACETAMINOPHEN 160 MG/5ML
1000 SUSPENSION ORAL ONCE
Refills: 0 | Status: DISCONTINUED | OUTPATIENT
Start: 2025-02-14 | End: 2025-02-14

## 2025-02-14 RX ORDER — HYDROMORPHONE HYDROCHLORIDE 4 MG/ML
0.5 INJECTION, SOLUTION INTRAMUSCULAR; INTRAVENOUS; SUBCUTANEOUS
Refills: 0 | Status: DISCONTINUED | OUTPATIENT
Start: 2025-02-14 | End: 2025-02-14

## 2025-02-14 RX ORDER — VANCOMYCIN HYDROCHLORIDE 50 MG/ML
1500 KIT ORAL ONCE
Refills: 0 | Status: COMPLETED | OUTPATIENT
Start: 2025-02-14 | End: 2025-02-14

## 2025-02-14 RX ORDER — ICOSAPENT ETHYL 0.5 G/1
2 CAPSULE ORAL
Refills: 0 | DISCHARGE

## 2025-02-14 RX ADMIN — FENTANYL CITRATE 25 MICROGRAM(S): 50 INJECTION INTRAMUSCULAR; INTRAVENOUS at 15:41

## 2025-02-14 RX ADMIN — VANCOMYCIN HYDROCHLORIDE 150 MILLIGRAM(S): KIT at 09:08

## 2025-02-14 RX ADMIN — FENTANYL CITRATE 25 MICROGRAM(S): 50 INJECTION INTRAMUSCULAR; INTRAVENOUS at 15:56

## 2025-02-14 RX ADMIN — SODIUM CHLORIDE 200 MILLILITER(S): 9 INJECTION, SOLUTION INTRAVENOUS at 09:08

## 2025-02-14 NOTE — ASU PREOP CHECKLIST - RESPIRATORY RATE (BREATHS/MIN)
Post-Op Assessment Note    CV Status:  Stable  Pain Score: 0    Pain management: adequate       Mental Status:  Alert and awake   Hydration Status:  Euvolemic   PONV Controlled:  Controlled   Airway Patency:  Patent     Post Op Vitals Reviewed: Yes    No anethesia notable event occurred.    Staff: Anesthesiologist, CRNA               BP   90/48   Temp   97.2   Pulse  84   Resp   14   SpO2   100     
16

## 2025-02-14 NOTE — ASU DISCHARGE PLAN (ADULT/PEDIATRIC) - CARE PROVIDER_API CALL
[place], Pernell  Urology  [address] [place] [id number]  Phone: [phone]  Fax: [phone]  Follow Up Time:

## 2025-02-14 NOTE — ASU PREOP CHECKLIST - SITE MARKED BY SURGEON
Relayed results/recommendations, patient verbalizes understanding. Script for advair sent to pharmacy.    n/a

## 2025-02-14 NOTE — ASU DISCHARGE PLAN (ADULT/PEDIATRIC) - FINANCIAL ASSISTANCE
[facility] provides services at a reduced cost to those who are determined to be eligible through [facility]’s financial assistance program. Information regarding [facility]’s financial assistance program can be found by going to [email or web address] or by calling [phone].

## 2025-02-14 NOTE — BRIEF OPERATIVE NOTE - OPERATION/FINDINGS
[employer]  artificial urinary sphincter placed, uncomplicated. 4 cm urethral cuff. No evidence of urethral leak/perforation with instillation of saline through urethra. Right sided PRB placed with 25 cc inside via counterincision in right lower abdomen. Pump placed in right scrotum in anterior superficial pocket, easily accessible. Hemostasis maintained.

## 2025-02-18 ENCOUNTER — NON-APPOINTMENT (OUTPATIENT)
Age: 65
End: 2025-02-18

## 2025-02-27 ENCOUNTER — APPOINTMENT (OUTPATIENT)
Dept: UROLOGY | Facility: CLINIC | Age: 65
End: 2025-02-27

## 2025-02-27 VITALS
DIASTOLIC BLOOD PRESSURE: 92 MMHG | OXYGEN SATURATION: 97 % | HEIGHT: 71 IN | BODY MASS INDEX: 29.4 KG/M2 | WEIGHT: 210 LBS | TEMPERATURE: 97.6 F | HEART RATE: 70 BPM | SYSTOLIC BLOOD PRESSURE: 156 MMHG

## 2025-02-27 DIAGNOSIS — N36.42 INTRINSIC SPHINCTER DEFICIENCY (ISD): ICD-10-CM

## 2025-02-27 DIAGNOSIS — R35.0 FREQUENCY OF MICTURITION: ICD-10-CM

## 2025-02-27 DIAGNOSIS — Z00.00 ENCOUNTER FOR GENERAL ADULT MEDICAL EXAMINATION W/OUT ABNORMAL FINDINGS: ICD-10-CM

## 2025-02-27 DIAGNOSIS — Z85.46 PERSONAL HISTORY OF MALIGNANT NEOPLASM OF PROSTATE: ICD-10-CM

## 2025-02-27 DIAGNOSIS — R33.9 RETENTION OF URINE, UNSPECIFIED: ICD-10-CM

## 2025-02-27 DIAGNOSIS — Z90.79 ACQUIRED ABSENCE OF OTHER GENITAL ORGAN(S): ICD-10-CM

## 2025-02-27 DIAGNOSIS — N39.3 STRESS INCONTINENCE (FEMALE) (MALE): ICD-10-CM

## 2025-02-27 DIAGNOSIS — Z96.0 PRESENCE OF UROGENITAL IMPLANTS: ICD-10-CM

## 2025-02-27 DIAGNOSIS — Z98.890 OTHER SPECIFIED POSTPROCEDURAL STATES: ICD-10-CM

## 2025-02-27 DIAGNOSIS — Z09 ENCOUNTER FOR FOLLOW-UP EXAMINATION AFTER COMPLETED TREATMENT FOR CONDITIONS OTHER THAN MALIGNANT NEOPLASM: ICD-10-CM

## 2025-02-27 DIAGNOSIS — Z48.816 ENCOUNTER FOR SURGICAL AFTERCARE FOLLOWING SURGERY ON THE GENITOURINARY SYSTEM: ICD-10-CM

## 2025-02-27 PROBLEM — E78.5 HYPERLIPIDEMIA, UNSPECIFIED: Chronic | Status: ACTIVE | Noted: 2025-02-13

## 2025-02-27 PROCEDURE — 51798 US URINE CAPACITY MEASURE: CPT

## 2025-02-27 PROCEDURE — 99024 POSTOP FOLLOW-UP VISIT: CPT

## 2025-03-02 PROBLEM — Z09 STATUS POST UROLOGICAL SURGERY, FOLLOW-UP EXAM: Status: ACTIVE | Noted: 2025-03-02

## 2025-03-02 PROBLEM — Z98.890 STATUS POST UROLOGICAL SURGERY: Status: ACTIVE | Noted: 2025-03-02

## 2025-03-27 ENCOUNTER — APPOINTMENT (OUTPATIENT)
Dept: UROLOGY | Facility: CLINIC | Age: 65
End: 2025-03-27
Payer: COMMERCIAL

## 2025-03-27 VITALS
OXYGEN SATURATION: 96 % | DIASTOLIC BLOOD PRESSURE: 99 MMHG | HEART RATE: 71 BPM | BODY MASS INDEX: 29.4 KG/M2 | WEIGHT: 210 LBS | SYSTOLIC BLOOD PRESSURE: 154 MMHG | HEIGHT: 71 IN | TEMPERATURE: 97 F

## 2025-03-27 DIAGNOSIS — R35.0 FREQUENCY OF MICTURITION: ICD-10-CM

## 2025-03-27 DIAGNOSIS — Z98.890 OTHER SPECIFIED POSTPROCEDURAL STATES: ICD-10-CM

## 2025-03-27 DIAGNOSIS — R33.9 RETENTION OF URINE, UNSPECIFIED: ICD-10-CM

## 2025-03-27 DIAGNOSIS — N39.3 STRESS INCONTINENCE (FEMALE) (MALE): ICD-10-CM

## 2025-03-27 DIAGNOSIS — Z00.00 ENCOUNTER FOR GENERAL ADULT MEDICAL EXAMINATION W/OUT ABNORMAL FINDINGS: ICD-10-CM

## 2025-03-27 DIAGNOSIS — Z96.0 PRESENCE OF UROGENITAL IMPLANTS: ICD-10-CM

## 2025-03-27 DIAGNOSIS — Z48.816 ENCOUNTER FOR SURGICAL AFTERCARE FOLLOWING SURGERY ON THE GENITOURINARY SYSTEM: ICD-10-CM

## 2025-03-27 DIAGNOSIS — N36.42 INTRINSIC SPHINCTER DEFICIENCY (ISD): ICD-10-CM

## 2025-03-27 PROCEDURE — 99024 POSTOP FOLLOW-UP VISIT: CPT

## 2025-03-27 PROCEDURE — 51798 US URINE CAPACITY MEASURE: CPT

## 2025-04-21 NOTE — ED ADULT NURSE NOTE - NSSUHOSCREENINGYN_ED_ALL_ED
Medication: amLODIPine (NORVASC) 10 MG tablet   Last office visit date: 01/29/2025  Medication Refill Protocol Failed.  Failed criteria: Prescribed by different MD . Sent to clinician to review.       [employer] text sent and rx sent to provider for review    Yes - the patient is able to be screened

## 2025-05-08 ENCOUNTER — APPOINTMENT (OUTPATIENT)
Dept: UROLOGY | Facility: CLINIC | Age: 65
End: 2025-05-08

## 2025-06-12 ENCOUNTER — APPOINTMENT (OUTPATIENT)
Dept: UROLOGY | Facility: CLINIC | Age: 65
End: 2025-06-12
Payer: COMMERCIAL

## 2025-06-12 VITALS
HEART RATE: 66 BPM | SYSTOLIC BLOOD PRESSURE: 154 MMHG | OXYGEN SATURATION: 98 % | TEMPERATURE: 97.9 F | HEIGHT: 71 IN | DIASTOLIC BLOOD PRESSURE: 93 MMHG | BODY MASS INDEX: 29.4 KG/M2 | WEIGHT: 210 LBS

## 2025-06-12 PROCEDURE — 99213 OFFICE O/P EST LOW 20 MIN: CPT | Mod: 25

## 2025-06-12 PROCEDURE — 51798 US URINE CAPACITY MEASURE: CPT

## (undated) DEVICE — TUBING SUCTION 20FT

## (undated) DEVICE — SUT VICRYL PLUS 3-0 27" RB-1 UNDYED

## (undated) DEVICE — LONE STAR ELASTIC STAY HOOK 12MM BLUNT

## (undated) DEVICE — STAPLER SKIN PROXIMATE

## (undated) DEVICE — DRSG TELFA 3 X 8

## (undated) DEVICE — GLV 6.5 PROTEXIS (WHITE)

## (undated) DEVICE — VISITEC 4X4

## (undated) DEVICE — SYR ASEPTO

## (undated) DEVICE — VENODYNE/SCD SLEEVE CALF MEDIUM

## (undated) DEVICE — ELCTR BOVIE PENCIL BLADE 10FT

## (undated) DEVICE — DRAIN URINARY LEG BAG WITH FLIP-FLO VALVE 32OZ

## (undated) DEVICE — SUT PDS II 2-0 27" SH

## (undated) DEVICE — Device

## (undated) DEVICE — GLV 9 PROTEXIS (WHITE)

## (undated) DEVICE — ELCTR STRYKER NEPTUNE BLADE COATED, INSULATED 70MM

## (undated) DEVICE — DRAPE 3/4 SHEET 52X76"

## (undated) DEVICE — WARMING BLANKET UPPER ADULT

## (undated) DEVICE — PACK CYSTO

## (undated) DEVICE — PACK PROST PENILE LNX SURGICOUNT

## (undated) DEVICE — PREP CHLORAPREP HI-LITE ORANGE 26ML

## (undated) DEVICE — DRAPE TOWEL BLUE 17" X 24"

## (undated) DEVICE — SUCTION YANKAUER BULBOUS TIP NO VENT

## (undated) DEVICE — DRSG DERMABOND 0.7ML

## (undated) DEVICE — SUT SILK 2-0 18" FS

## (undated) DEVICE — GLV 7 PROTEXIS (WHITE)

## (undated) DEVICE — SUT PDS PLUS 3-0 27" RB-1

## (undated) DEVICE — VESSEL LOOP MINI-BLUE 0.075" X 16"

## (undated) DEVICE — SUT SILK 0 18" FSL